# Patient Record
Sex: MALE | Race: WHITE | NOT HISPANIC OR LATINO | Employment: OTHER | URBAN - METROPOLITAN AREA
[De-identification: names, ages, dates, MRNs, and addresses within clinical notes are randomized per-mention and may not be internally consistent; named-entity substitution may affect disease eponyms.]

---

## 2017-03-31 ENCOUNTER — HOSPITAL ENCOUNTER (OUTPATIENT)
Dept: LAB | Facility: MEDICAL CENTER | Age: 62
End: 2017-03-31
Attending: ORTHOPAEDIC SURGERY
Payer: COMMERCIAL

## 2017-03-31 LAB
CRP SERPL HS-MCNC: 2.5 MG/L (ref 0–7.5)
ERYTHROCYTE [SEDIMENTATION RATE] IN BLOOD BY WESTERGREN METHOD: 9 MM/HOUR (ref 0–20)

## 2017-03-31 PROCEDURE — 36415 COLL VENOUS BLD VENIPUNCTURE: CPT

## 2017-03-31 PROCEDURE — 85652 RBC SED RATE AUTOMATED: CPT

## 2017-03-31 PROCEDURE — 86141 C-REACTIVE PROTEIN HS: CPT

## 2017-04-24 ENCOUNTER — APPOINTMENT (OUTPATIENT)
Dept: RADIOLOGY | Facility: MEDICAL CENTER | Age: 62
DRG: 189 | End: 2017-04-24
Attending: EMERGENCY MEDICINE
Payer: COMMERCIAL

## 2017-04-24 ENCOUNTER — RESOLUTE PROFESSIONAL BILLING HOSPITAL PROF FEE (OUTPATIENT)
Dept: MEDSURG UNIT | Facility: MEDICAL CENTER | Age: 62
End: 2017-04-24
Payer: COMMERCIAL

## 2017-04-24 ENCOUNTER — HOSPITAL ENCOUNTER (INPATIENT)
Facility: MEDICAL CENTER | Age: 62
LOS: 2 days | DRG: 189 | End: 2017-04-26
Attending: EMERGENCY MEDICINE | Admitting: INTERNAL MEDICINE
Payer: COMMERCIAL

## 2017-04-24 DIAGNOSIS — J45.901 ASTHMA EXACERBATION: ICD-10-CM

## 2017-04-24 PROBLEM — K21.9 GERD (GASTROESOPHAGEAL REFLUX DISEASE): Status: ACTIVE | Noted: 2017-04-24

## 2017-04-24 PROBLEM — R94.31 PROLONGED Q-T INTERVAL ON ECG: Status: ACTIVE | Noted: 2017-04-24

## 2017-04-24 PROBLEM — E87.6 HYPOKALEMIA: Status: ACTIVE | Noted: 2017-04-24

## 2017-04-24 PROBLEM — R91.1 PULMONARY NODULE, RIGHT: Status: ACTIVE | Noted: 2017-04-24

## 2017-04-24 PROBLEM — L40.9 PSORIASIS AND SIMILAR DISORDER: Status: ACTIVE | Noted: 2017-04-24

## 2017-04-24 PROBLEM — R21 SKIN RASH: Status: ACTIVE | Noted: 2017-04-24

## 2017-04-24 LAB
ALBUMIN SERPL BCP-MCNC: 4.3 G/DL (ref 3.2–4.9)
ALBUMIN/GLOB SERPL: 1.3 G/DL
ALP SERPL-CCNC: 87 U/L (ref 30–99)
ALT SERPL-CCNC: 36 U/L (ref 2–50)
ANION GAP SERPL CALC-SCNC: 11 MMOL/L (ref 0–11.9)
APTT PPP: 34.9 SEC (ref 24.7–36)
AST SERPL-CCNC: 30 U/L (ref 12–45)
BASOPHILS # BLD AUTO: 0.5 % (ref 0–1.8)
BASOPHILS # BLD: 0.05 K/UL (ref 0–0.12)
BILIRUB SERPL-MCNC: 0.8 MG/DL (ref 0.1–1.5)
BNP SERPL-MCNC: 19 PG/ML (ref 0–100)
BUN SERPL-MCNC: 15 MG/DL (ref 8–22)
CALCIUM SERPL-MCNC: 9.9 MG/DL (ref 8.5–10.5)
CHLORIDE SERPL-SCNC: 101 MMOL/L (ref 96–112)
CO2 SERPL-SCNC: 23 MMOL/L (ref 20–33)
CREAT SERPL-MCNC: 0.84 MG/DL (ref 0.5–1.4)
DEPRECATED D DIMER PPP IA-ACNC: 825 NG/ML(D-DU)
EKG IMPRESSION: NORMAL
EOSINOPHIL # BLD AUTO: 0.3 K/UL (ref 0–0.51)
EOSINOPHIL NFR BLD: 3.1 % (ref 0–6.9)
ERYTHROCYTE [DISTWIDTH] IN BLOOD BY AUTOMATED COUNT: 43.4 FL (ref 35.9–50)
EST. AVERAGE GLUCOSE BLD GHB EST-MCNC: 117 MG/DL
GFR SERPL CREATININE-BSD FRML MDRD: >60 ML/MIN/1.73 M 2
GLOBULIN SER CALC-MCNC: 3.4 G/DL (ref 1.9–3.5)
GLUCOSE SERPL-MCNC: 146 MG/DL (ref 65–99)
HBA1C MFR BLD: 5.7 % (ref 0–5.6)
HCT VFR BLD AUTO: 49.3 % (ref 42–52)
HGB BLD-MCNC: 16.6 G/DL (ref 14–18)
IMM GRANULOCYTES # BLD AUTO: 0.05 K/UL (ref 0–0.11)
IMM GRANULOCYTES NFR BLD AUTO: 0.5 % (ref 0–0.9)
INR PPP: 1.18 (ref 0.87–1.13)
LACTATE BLD-SCNC: 2.8 MMOL/L (ref 0.5–2)
LYMPHOCYTES # BLD AUTO: 2.24 K/UL (ref 1–4.8)
LYMPHOCYTES NFR BLD: 23.4 % (ref 22–41)
MCH RBC QN AUTO: 29.6 PG (ref 27–33)
MCHC RBC AUTO-ENTMCNC: 33.7 G/DL (ref 33.7–35.3)
MCV RBC AUTO: 88 FL (ref 81.4–97.8)
MONOCYTES # BLD AUTO: 0.89 K/UL (ref 0–0.85)
MONOCYTES NFR BLD AUTO: 9.3 % (ref 0–13.4)
NEUTROPHILS # BLD AUTO: 6.05 K/UL (ref 1.82–7.42)
NEUTROPHILS NFR BLD: 63.2 % (ref 44–72)
NRBC # BLD AUTO: 0 K/UL
NRBC BLD AUTO-RTO: 0 /100 WBC
PLATELET # BLD AUTO: 347 K/UL (ref 164–446)
PMV BLD AUTO: 9 FL (ref 9–12.9)
POTASSIUM SERPL-SCNC: 3.4 MMOL/L (ref 3.6–5.5)
PROT SERPL-MCNC: 7.7 G/DL (ref 6–8.2)
PROTHROMBIN TIME: 15.4 SEC (ref 12–14.6)
RBC # BLD AUTO: 5.6 M/UL (ref 4.7–6.1)
SODIUM SERPL-SCNC: 135 MMOL/L (ref 135–145)
TROPONIN I SERPL-MCNC: <0.01 NG/ML (ref 0–0.04)
TSH SERPL DL<=0.005 MIU/L-ACNC: 1.06 UIU/ML (ref 0.3–3.7)
WBC # BLD AUTO: 9.6 K/UL (ref 4.8–10.8)

## 2017-04-24 PROCEDURE — A9270 NON-COVERED ITEM OR SERVICE: HCPCS | Performed by: STUDENT IN AN ORGANIZED HEALTH CARE EDUCATION/TRAINING PROGRAM

## 2017-04-24 PROCEDURE — 700111 HCHG RX REV CODE 636 W/ 250 OVERRIDE (IP): Performed by: STUDENT IN AN ORGANIZED HEALTH CARE EDUCATION/TRAINING PROGRAM

## 2017-04-24 PROCEDURE — 99285 EMERGENCY DEPT VISIT HI MDM: CPT

## 2017-04-24 PROCEDURE — 96365 THER/PROPH/DIAG IV INF INIT: CPT

## 2017-04-24 PROCEDURE — A9270 NON-COVERED ITEM OR SERVICE: HCPCS | Performed by: HOSPITALIST

## 2017-04-24 PROCEDURE — 93005 ELECTROCARDIOGRAM TRACING: CPT | Performed by: HOSPITALIST

## 2017-04-24 PROCEDURE — 83605 ASSAY OF LACTIC ACID: CPT

## 2017-04-24 PROCEDURE — 700105 HCHG RX REV CODE 258: Performed by: STUDENT IN AN ORGANIZED HEALTH CARE EDUCATION/TRAINING PROGRAM

## 2017-04-24 PROCEDURE — 700111 HCHG RX REV CODE 636 W/ 250 OVERRIDE (IP): Performed by: EMERGENCY MEDICINE

## 2017-04-24 PROCEDURE — 94760 N-INVAS EAR/PLS OXIMETRY 1: CPT

## 2017-04-24 PROCEDURE — 700101 HCHG RX REV CODE 250: Performed by: EMERGENCY MEDICINE

## 2017-04-24 PROCEDURE — 700102 HCHG RX REV CODE 250 W/ 637 OVERRIDE(OP): Performed by: STUDENT IN AN ORGANIZED HEALTH CARE EDUCATION/TRAINING PROGRAM

## 2017-04-24 PROCEDURE — 700105 HCHG RX REV CODE 258: Performed by: EMERGENCY MEDICINE

## 2017-04-24 PROCEDURE — 84484 ASSAY OF TROPONIN QUANT: CPT

## 2017-04-24 PROCEDURE — 96375 TX/PRO/DX INJ NEW DRUG ADDON: CPT

## 2017-04-24 PROCEDURE — 96368 THER/DIAG CONCURRENT INF: CPT

## 2017-04-24 PROCEDURE — 87040 BLOOD CULTURE FOR BACTERIA: CPT | Mod: 91

## 2017-04-24 PROCEDURE — 700101 HCHG RX REV CODE 250: Performed by: STUDENT IN AN ORGANIZED HEALTH CARE EDUCATION/TRAINING PROGRAM

## 2017-04-24 PROCEDURE — 85025 COMPLETE CBC W/AUTO DIFF WBC: CPT

## 2017-04-24 PROCEDURE — 83036 HEMOGLOBIN GLYCOSYLATED A1C: CPT

## 2017-04-24 PROCEDURE — 36415 COLL VENOUS BLD VENIPUNCTURE: CPT

## 2017-04-24 PROCEDURE — 83880 ASSAY OF NATRIURETIC PEPTIDE: CPT

## 2017-04-24 PROCEDURE — 85730 THROMBOPLASTIN TIME PARTIAL: CPT

## 2017-04-24 PROCEDURE — 94640 AIRWAY INHALATION TREATMENT: CPT

## 2017-04-24 PROCEDURE — 84443 ASSAY THYROID STIM HORMONE: CPT

## 2017-04-24 PROCEDURE — 96361 HYDRATE IV INFUSION ADD-ON: CPT

## 2017-04-24 PROCEDURE — 85610 PROTHROMBIN TIME: CPT

## 2017-04-24 PROCEDURE — 71010 DX-CHEST-PORTABLE (1 VIEW): CPT

## 2017-04-24 PROCEDURE — 700102 HCHG RX REV CODE 250 W/ 637 OVERRIDE(OP): Performed by: HOSPITALIST

## 2017-04-24 PROCEDURE — 770006 HCHG ROOM/CARE - MED/SURG/GYN SEMI*

## 2017-04-24 PROCEDURE — 700117 HCHG RX CONTRAST REV CODE 255: Performed by: EMERGENCY MEDICINE

## 2017-04-24 PROCEDURE — 85379 FIBRIN DEGRADATION QUANT: CPT

## 2017-04-24 PROCEDURE — 87070 CULTURE OTHR SPECIMN AEROBIC: CPT

## 2017-04-24 PROCEDURE — 84145 PROCALCITONIN (PCT): CPT

## 2017-04-24 PROCEDURE — 80053 COMPREHEN METABOLIC PANEL: CPT

## 2017-04-24 PROCEDURE — 71275 CT ANGIOGRAPHY CHEST: CPT

## 2017-04-24 RX ORDER — SODIUM CHLORIDE 9 MG/ML
1000 INJECTION, SOLUTION INTRAVENOUS ONCE
Status: COMPLETED | OUTPATIENT
Start: 2017-04-24 | End: 2017-04-24

## 2017-04-24 RX ORDER — AZITHROMYCIN 250 MG/1
250-500 TABLET, FILM COATED ORAL DAILY
Status: ON HOLD | COMMUNITY
Start: 2017-04-19 | End: 2017-04-25

## 2017-04-24 RX ORDER — CEFTRIAXONE 2 G/1
2 INJECTION, POWDER, FOR SOLUTION INTRAMUSCULAR; INTRAVENOUS ONCE
Status: COMPLETED | OUTPATIENT
Start: 2017-04-24 | End: 2017-04-24

## 2017-04-24 RX ORDER — METHYLPREDNISOLONE SODIUM SUCCINATE 125 MG/2ML
125 INJECTION, POWDER, LYOPHILIZED, FOR SOLUTION INTRAMUSCULAR; INTRAVENOUS ONCE
Status: COMPLETED | OUTPATIENT
Start: 2017-04-24 | End: 2017-04-24

## 2017-04-24 RX ORDER — POTASSIUM CHLORIDE 20 MEQ/1
20 TABLET, EXTENDED RELEASE ORAL ONCE
Status: DISCONTINUED | OUTPATIENT
Start: 2017-04-24 | End: 2017-04-24

## 2017-04-24 RX ORDER — IPRATROPIUM BROMIDE AND ALBUTEROL SULFATE 2.5; .5 MG/3ML; MG/3ML
3 SOLUTION RESPIRATORY (INHALATION)
Status: DISCONTINUED | OUTPATIENT
Start: 2017-04-24 | End: 2017-04-25

## 2017-04-24 RX ORDER — OMEPRAZOLE 20 MG/1
20 CAPSULE, DELAYED RELEASE ORAL DAILY
Status: DISCONTINUED | OUTPATIENT
Start: 2017-04-24 | End: 2017-04-26 | Stop reason: HOSPADM

## 2017-04-24 RX ORDER — GUAIFENESIN/DEXTROMETHORPHAN 100-10MG/5
10 SYRUP ORAL EVERY 6 HOURS PRN
Status: DISCONTINUED | OUTPATIENT
Start: 2017-04-24 | End: 2017-04-26 | Stop reason: HOSPADM

## 2017-04-24 RX ORDER — POTASSIUM CHLORIDE 20 MEQ/1
40 TABLET, EXTENDED RELEASE ORAL ONCE
Status: COMPLETED | OUTPATIENT
Start: 2017-04-24 | End: 2017-04-24

## 2017-04-24 RX ORDER — HYDROCODONE BITARTRATE AND ACETAMINOPHEN 10; 325 MG/1; MG/1
1-2 TABLET ORAL EVERY 4 HOURS PRN
Status: DISCONTINUED | OUTPATIENT
Start: 2017-04-24 | End: 2017-04-26 | Stop reason: HOSPADM

## 2017-04-24 RX ORDER — ALBUTEROL SULFATE 90 UG/1
2 AEROSOL, METERED RESPIRATORY (INHALATION) EVERY 6 HOURS PRN
COMMUNITY

## 2017-04-24 RX ORDER — AZITHROMYCIN 500 MG/1
500 INJECTION, POWDER, LYOPHILIZED, FOR SOLUTION INTRAVENOUS ONCE
Status: COMPLETED | OUTPATIENT
Start: 2017-04-24 | End: 2017-04-24

## 2017-04-24 RX ORDER — DIPHENHYDRAMINE HCL 25 MG
25 TABLET ORAL EVERY 6 HOURS PRN
Status: DISCONTINUED | OUTPATIENT
Start: 2017-04-24 | End: 2017-04-26 | Stop reason: HOSPADM

## 2017-04-24 RX ORDER — PREDNISONE 20 MG/1
40 TABLET ORAL DAILY
Status: DISCONTINUED | OUTPATIENT
Start: 2017-04-24 | End: 2017-04-26 | Stop reason: HOSPADM

## 2017-04-24 RX ORDER — CYCLOBENZAPRINE HCL 10 MG
10 TABLET ORAL 3 TIMES DAILY PRN
Status: DISCONTINUED | OUTPATIENT
Start: 2017-04-24 | End: 2017-04-26 | Stop reason: HOSPADM

## 2017-04-24 RX ORDER — AMOXICILLIN AND CLAVULANATE POTASSIUM 875; 125 MG/1; MG/1
1 TABLET, FILM COATED ORAL 2 TIMES DAILY
Status: ON HOLD | COMMUNITY
Start: 2017-04-05 | End: 2017-04-25

## 2017-04-24 RX ORDER — CLOBETASOL PROPIONATE 0.5 MG/G
OINTMENT TOPICAL 2 TIMES DAILY
Status: DISCONTINUED | OUTPATIENT
Start: 2017-04-24 | End: 2017-04-26 | Stop reason: HOSPADM

## 2017-04-24 RX ORDER — ALBUTEROL SULFATE 90 UG/1
2 AEROSOL, METERED RESPIRATORY (INHALATION)
Status: DISCONTINUED | OUTPATIENT
Start: 2017-04-24 | End: 2017-04-25

## 2017-04-24 RX ORDER — HYDRALAZINE HYDROCHLORIDE 20 MG/ML
20 INJECTION INTRAMUSCULAR; INTRAVENOUS EVERY 6 HOURS PRN
Status: DISCONTINUED | OUTPATIENT
Start: 2017-04-24 | End: 2017-04-26 | Stop reason: HOSPADM

## 2017-04-24 RX ORDER — BISACODYL 10 MG
10 SUPPOSITORY, RECTAL RECTAL
Status: DISCONTINUED | OUTPATIENT
Start: 2017-04-24 | End: 2017-04-26 | Stop reason: HOSPADM

## 2017-04-24 RX ORDER — AMOXICILLIN 250 MG
2 CAPSULE ORAL 2 TIMES DAILY
Status: DISCONTINUED | OUTPATIENT
Start: 2017-04-24 | End: 2017-04-26 | Stop reason: HOSPADM

## 2017-04-24 RX ORDER — POLYETHYLENE GLYCOL 3350 17 G/17G
1 POWDER, FOR SOLUTION ORAL
Status: DISCONTINUED | OUTPATIENT
Start: 2017-04-24 | End: 2017-04-26 | Stop reason: HOSPADM

## 2017-04-24 RX ORDER — METHYLPREDNISOLONE 4 MG/1
4-24 TABLET ORAL DAILY
Status: ON HOLD | COMMUNITY
Start: 2017-04-19 | End: 2017-04-25

## 2017-04-24 RX ADMIN — CEFTRIAXONE 2 G: 2 INJECTION, POWDER, FOR SOLUTION INTRAMUSCULAR; INTRAVENOUS at 19:41

## 2017-04-24 RX ADMIN — METHYLPREDNISOLONE SODIUM SUCCINATE 125 MG: 125 INJECTION, POWDER, FOR SOLUTION INTRAMUSCULAR; INTRAVENOUS at 17:11

## 2017-04-24 RX ADMIN — ALBUTEROL SULFATE 2 PUFF: 90 AEROSOL, METERED RESPIRATORY (INHALATION) at 23:26

## 2017-04-24 RX ADMIN — SODIUM CHLORIDE 1000 ML: 9 INJECTION, SOLUTION INTRAVENOUS at 22:44

## 2017-04-24 RX ADMIN — IOHEXOL 75 ML: 350 INJECTION, SOLUTION INTRAVENOUS at 19:11

## 2017-04-24 RX ADMIN — ALBUTEROL SULFATE 2.5 MG: 2.5 SOLUTION RESPIRATORY (INHALATION) at 17:18

## 2017-04-24 RX ADMIN — DOXYCYCLINE 100 MG: 100 INJECTION, POWDER, LYOPHILIZED, FOR SOLUTION INTRAVENOUS at 23:22

## 2017-04-24 RX ADMIN — OMEPRAZOLE 20 MG: 20 CAPSULE, DELAYED RELEASE ORAL at 23:06

## 2017-04-24 RX ADMIN — PREDNISONE 40 MG: 20 TABLET ORAL at 23:06

## 2017-04-24 RX ADMIN — AZITHROMYCIN MONOHYDRATE 500 MG: 500 INJECTION, POWDER, LYOPHILIZED, FOR SOLUTION INTRAVENOUS at 20:39

## 2017-04-24 RX ADMIN — SODIUM CHLORIDE 1000 ML: 9 INJECTION, SOLUTION INTRAVENOUS at 17:11

## 2017-04-24 RX ADMIN — POTASSIUM CHLORIDE 40 MEQ: 1500 TABLET, EXTENDED RELEASE ORAL at 20:39

## 2017-04-24 ASSESSMENT — ENCOUNTER SYMPTOMS
DIAPHORESIS: 0
SORE THROAT: 0
BLURRED VISION: 0
DIZZINESS: 0
ABDOMINAL PAIN: 0
FEVER: 0
DOUBLE VISION: 0
BRUISES/BLEEDS EASILY: 0
BLOOD IN STOOL: 0
NERVOUS/ANXIOUS: 0
HEARTBURN: 1
CONSTIPATION: 0
NAUSEA: 0
SHORTNESS OF BREATH: 1
WEAKNESS: 0
DIARRHEA: 0
HEMOPTYSIS: 0
CHILLS: 0
SENSORY CHANGE: 0
VOMITING: 0
PALPITATIONS: 0
COUGH: 1
FALLS: 0
SPUTUM PRODUCTION: 1
FOCAL WEAKNESS: 0
BACK PAIN: 1
HEADACHES: 1
WHEEZING: 1

## 2017-04-24 ASSESSMENT — LIFESTYLE VARIABLES
ALCOHOL_USE: NO
SUBSTANCE_ABUSE: 0
DO YOU DRINK ALCOHOL: NO
EVER_SMOKED: NEVER

## 2017-04-24 ASSESSMENT — PAIN SCALES - GENERAL: PAINLEVEL_OUTOF10: 0

## 2017-04-24 NOTE — IP AVS SNAPSHOT
" Home Care Instructions                                                                                                                  Name:Everardo Edwards  Medical Record Number:[id number]  CSN: [id number]    YOB: 1955   Age: 61 y.o.  Sex: male  HT:1.753 m (5' 9\") WT: 74.4 kg (164 lb 0.4 oz)          Admit Date: 4/24/2017     Discharge Date:   Today's Date: 4/26/2017  Attending Doctor:  Luis Lakhani                  Allergies:  Review of patient's allergies indicates no known allergies.            Discharge Instructions       Discharge Instructions    Discharged to home by car with relative. Discharged via wheelchair, hospital escort: Yes.  Special equipment needed: Not Applicable    Be sure to schedule a follow-up appointment with your primary care doctor or any specialists as instructed.     Discharge Plan:   Diet Plan: Discussed  Activity Level: Discussed  Confirmed Follow up Appointment: Appointment Scheduled  Confirmed Symptoms Management: Discussed  Medication Reconciliation Updated: Yes  Influenza Vaccine Indication: Patient Refuses    I understand that a diet low in cholesterol, fat, and sodium is recommended for good health. Unless I have been given specific instructions below for another diet, I accept this instruction as my diet prescription.   Other diet: regular    Special Instructions: Please remind patient to follow up his scheduled appointment with his PCP 04/28/17 and dermatologist 05/10/17      · Is patient discharged on Warfarin / Coumadin?   No     · Is patient Post Blood Transfusion?  No    Depression / Suicide Risk    As you are discharged from this [facility] facility, it is important to learn how to keep safe from harming yourself.    Recognize the warning signs:  · Abrupt changes in personality, positive or negative- including increase in energy   · Giving away possessions  · Change in eating patterns- significant weight changes-  positive or negative  · Change " in sleeping patterns- unable to sleep or sleeping all the time   · Unwillingness or inability to communicate  · Depression  · Unusual sadness, discouragement and loneliness  · Talk of wanting to die  · Neglect of personal appearance   · Rebelliousness- reckless behavior  · Withdrawal from people/activities they love  · Confusion- inability to concentrate     If you or a loved one observes any of these behaviors or has concerns about self-harm, here's what you can do:  · Talk about it- your feelings and reasons for harming yourself  · Remove any means that you might use to hurt yourself (examples: pills, rope, extension cords, firearm)  · Get professional help from the community (Mental Health, Substance Abuse, psychological counseling)  · Do not be alone:Call your Safe Contact- someone whom you trust who will be there for you.  · Call your local CRISIS HOTLINE 009-5350 or 911-968-0755  · Call your local Children's Mobile Crisis Response Team [place] (587) 301-1985 or www.Inviragen  · Call the toll free National Suicide Prevention Hotlines   · National Suicide Prevention Lifeline 728-445-ACPD (8537)  · National Hope Line Network 800-SUICIDE (926-1200)           Discharge Medication Instructions:    Below are the medications your physician expects you to take upon discharge:    Review all your home medications and newly ordered medications with your doctor and/or pharmacist. Follow medication instructions as directed by your doctor and/or pharmacist.    Please keep your medication list with you and share with your physician.               Medication List      START taking these medications        Instructions    Morning Afternoon Evening Bedtime    predniSONE 10 MG Tabs   Last time this was given:  40 mg on 4/26/2017  7:35 AM   Commonly known as:  DELTASONE        Take as described. And follow with Dermatology.  40mg x 1 day. 4/26/17, 30mg x 4 days 4/27-4/30, 20mg x 4days 4/31-5/03, 10mg x 4days  5/04-5/07,  5mg x 4days.  5/8 to 5/12.                          CONTINUE taking these medications        Instructions    Morning Afternoon Evening Bedtime    albuterol 108 (90 BASE) MCG/ACT Aers inhalation aerosol   Last time this was given:  2 Puffs on 4/24/2017 11:26 PM        Inhale 2 Puffs by mouth every 6 hours as needed for Shortness of Breath.   Dose:  2 Puff                        cyclobenzaprine 10 MG Tabs   Commonly known as:  FLEXERIL        Take 10 mg by mouth 3 times a day as needed.   Dose:  10 mg                        fluticasone-salmeterol 100-50 MCG/DOSE Aepb   Commonly known as:  ADVAIR        Inhale 1 Puff by mouth every day.   Dose:  1 Puff                        hydrocodone/acetaminophen  MG Tabs   Last time this was given:  1 Tab on 4/25/2017  7:03 AM   Commonly known as:  NORCO        Take 1-2 Tabs by mouth every four hours as needed for Moderate Pain.   Dose:  1-2 Tab                        MUCINEX PO        Take 15-30 mL by mouth 2 times a day as needed.   Dose:  15-30 mL                          STOP taking these medications     amoxicillin-clavulanate 875-125 MG Tabs   Commonly known as:  AUGMENTIN               azithromycin 250 MG Tabs   Commonly known as:  ZITHROMAX               MethylPREDNISolone 4 MG Tbpk   Commonly known as:  MEDROL DOSEPAK                    Where to Get Your Medications      Information about where to get these medications is not yet available     ! Ask your nurse or doctor about these medications    - predniSONE 10 MG Tabs            Instructions           Diet / Nutrition:    Follow any diet instructions given to you by your doctor or the dietician, including how much salt (sodium) you are allowed each day.    If you are overweight, talk to your doctor about a weight reduction plan.    Activity:    Remain physically active following your doctor's instructions about exercise and activity.    Rest often.     Any time you become even a little tired or short  of breath, SIT DOWN and rest.    Worsening Symptoms:    Report any of the following signs and symptoms to the doctor's office immediately:    *Pain of jaw, arm, or neck  *Chest pain not relieved by medication                               *Dizziness or loss of consciousness  *Difficulty breathing even when at rest   *More tired than usual                                       *Bleeding drainage or swelling of surgical site  *Swelling of feet, ankles, legs or stomach                 *Fever (>100ºF)  *Pink or blood tinged sputum  *Weight gain (3lbs/day or 5lbs /week)           *Shock from internal defibrillator (if applicable)  *Palpitations or irregular heartbeats                *Cool and/or numb extremities    Stroke Awareness    Common Risk Factors for Stroke include:    Age  Atrial Fibrillation  Carotid Artery Stenosis  Diabetes Mellitus  Excessive alcohol consumption  High blood pressure  Overweight   Physical inactivity  Smoking    Warning signs and symptoms of a stroke include:    *Sudden numbness or weakness of the face, arm or leg (especially on one side of the body).  *Sudden confusion, trouble speaking or understanding.  *Sudden trouble seeing in one or both eyes.  *Sudden trouble walking, dizziness, loss of balance or coordination.Sudden severe headache with no known cause.    It is very important to get treatment quickly when a stroke occurs. If you experience any of the above warning signs, call 911 immediately.                   Disclaimer         Quit Smoking / Tobacco Use:    I understand the use of any tobacco products increases my chance of suffering from future heart disease or stroke and could cause other illnesses which may shorten my life. Quitting the use of tobacco products is the single most important thing I can do to improve my health. For further information on smoking / tobacco cessation call a Toll Free Quit Line at 1-732.747.6593 (*National [employer] [place]) or 1-914.205.1704 (American  Lung Association) or you can access the web based program at www.lungusa.org.    [place] Tobacco Users Help Line:  (336) 762-1949       Toll Free: 1-994.307.1009  Quit Tobacco Program [facility] (520)958-1557    Crisis Hotline:    [place] Crisis Hotline:  6-293-RKTRPZY or 1-361.427.8201    [place] Crisis Hotline:    1-568.913.9794 or 064-187-0913    Discharge Survey:   Thank you for choosing [facility]. We hope we did everything we could to make your hospital stay a pleasant one. You may be receiving a phone survey and we would appreciate your time and participation in answering the questions. Your input is very valuable to us in our efforts to improve our service to our patients and their families.        My signature on this form indicates that:    1. I have reviewed and understand the above information.  2. My questions regarding this information have been answered to my satisfaction.  3. I have formulated a plan with my discharge nurse to obtain my prescribed medications for home.                  Disclaimer         __________________________________                     __________       ________                       Patient Signature                                                 Date                    Time

## 2017-04-24 NOTE — IP AVS SNAPSHOT
" <p align=\"LEFT\"><IMG SRC=\"[email or web address]\" alt=\"Image\" WIDTH=\"50%\" HEIGHT=\"200\" BORDER=\"\"></p>                   Name:Everardo Edwards  Medical Record Number:[id number]  CSN: [id number]    YOB: 1955   Age: 61 y.o.  Sex: male  HT:1.753 m (5' 9\") WT: 74.4 kg (164 lb 0.4 oz)          Admit Date: 4/24/2017     Discharge Date:   Today's Date: 4/26/2017  Attending Doctor:  Luis Lakhani                  Allergies:  Review of patient's allergies indicates no known allergies.             Medication List      Take these Medications        Instructions    albuterol 108 (90 BASE) MCG/ACT Aers inhalation aerosol    Inhale 2 Puffs by mouth every 6 hours as needed for Shortness of Breath.   Dose:  2 Puff       cyclobenzaprine 10 MG Tabs   Commonly known as:  FLEXERIL    Take 10 mg by mouth 3 times a day as needed.   Dose:  10 mg       fluticasone-salmeterol 100-50 MCG/DOSE Aepb   Commonly known as:  ADVAIR    Inhale 1 Puff by mouth every day.   Dose:  1 Puff       hydrocodone/acetaminophen  MG Tabs   Commonly known as:  NORCO    Take 1-2 Tabs by mouth every four hours as needed for Moderate Pain.   Dose:  1-2 Tab       MUCINEX PO    Take 15-30 mL by mouth 2 times a day as needed.   Dose:  15-30 mL       predniSONE 10 MG Tabs   Commonly known as:  DELTASONE    Take as described. And follow with Dermatology.  40mg x 1 day. 4/26/17, 30mg x 4 days 4/27-4/30, 20mg x 4days 4/31-5/03, 10mg x 4days 5/04-5/07,  5mg x 4days.  5/8 to 5/12.         "

## 2017-04-24 NOTE — ED NOTES
.  Chief Complaint   Patient presents with   • Bronchitis     seen at [facility] dx wednesday   • Cold Symptoms     x 2 weeks   • Hives     torso    • Rash     left knee x 1 month     Pt reports sob cold symptoms x 2 weeks. Multiple other c/o. Pt has hives to torso x 1 month. Rash to post op left knee intermittent since oct. Pt from [place] seen at [place], orthopedic and hematologist for these symptoms. Remains unknown as to what is cause.

## 2017-04-24 NOTE — IP AVS SNAPSHOT
4/26/2017    Everardo Edwards  [id number] [facility] [address]    Dear Everardo:    Replaced by [facility] wants to ensure your discharge home is safe and you or your loved ones have had all of your questions answered regarding your care after you leave the hospital.    Below is a list of resources and contact information should you have any questions regarding your hospital stay, follow-up instructions, or active medical symptoms.    Questions or Concerns Regarding… Contact   Medical Questions Related to Your Discharge  (7 days a week, 8am-5pm) Contact a Nurse Care Coordinator   [phone]   Medical Questions Not Related to Your Discharge  (24 hours a day / 7 days a week)  Contact the Nurse Health Line   [phone]    Medications or Discharge Instructions Refer to your discharge packet   or contact your [facility] Primary Care Provider   [phone]   Follow-up Appointment(s) Schedule your appointment via Satispay   or contact Scheduling [phone]   Billing Review your statement via Satispay  or contact Billing [phone]   Medical Records Review your records via Satispay   or contact Medical Records [phone]     You may receive a telephone call within two days of discharge. This call is to make certain you understand your discharge instructions and have the opportunity to have any questions answered. You can also easily access your medical information, test results and upcoming appointments via the Satispay free online health management tool. You can learn more and sign up at [email or web address]. For assistance setting up your Satispay account, please call [phone].    Once again, we want to ensure your discharge home is safe and that you have a clear understanding of any next steps in your care. If you have any questions or concerns, please do not hesitate to contact us, we are here for you. Thank you for choosing [facility] for your healthcare needs.    Sincerely,    Your [facility] Healthcare Team

## 2017-04-24 NOTE — IP AVS SNAPSHOT
[employer] Access Code: [email or web address]  Expires: 4/30/2017  4:37 PM    [employer]  A secure, online tool to manage your health information     [employer]’s [employer]® is a secure, online tool that connects you to your personalized health information from the privacy of your home -- day or night - making it very easy for you to manage your healthcare. Once the activation process is completed, you can even access your medical information using the [employer] reynaldo, which is available for free in the Apple Reynaldo store or Google Play store.     [employer] provides the following levels of access (as shown below):   My Chart Features   [facility] Primary Care Doctor [facility]  Specialists [facility]  Urgent  Care Non-[facility]  Primary Care  Doctor   Email your healthcare team securely and privately 24/7 X X X X   Manage appointments: schedule your next appointment; view details of past/upcoming appointments X      Request prescription refills. X      View recent personal medical records, including lab and immunizations X X X X   View health record, including health history, allergies, medications X X X X   Read reports about your outpatient visits, procedures, consult and ER notes X X X X   See your discharge summary, which is a recap of your hospital and/or ER visit that includes your diagnosis, lab results, and care plan. X X       How to register for [employer]:  1. Go to  [email or web address].  2. Click on the Sign Up Now box, which takes you to the New Member Sign Up page. You will need to provide the following information:  a. Enter your [employer] Access Code exactly as it appears at the top of this page. (You will not need to use this code after you’ve completed the sign-up process. If you do not sign up before the expiration date, you must request a new code.)   b. Enter your date of birth.   c. Enter your home email address.   d. Click Submit, and follow the next screen’s instructions.  3. Create a [employer] ID. This will be your [employer]  login ID and cannot be changed, so think of one that is secure and easy to remember.  4. Create a [employer] password. You can change your password at any time.  5. Enter your Password Reset Question and Answer. This can be used at a later time if you forget your password.   6. Enter your e-mail address. This allows you to receive e-mail notifications when new information is available in 24tidy.  7. Click Sign Up. You can now view your health information.    For assistance activating your [employer] account, call [phone]

## 2017-04-24 NOTE — ED PROVIDER NOTES
ED Provider Note    CHIEF COMPLAINT  Chief Complaint   Patient presents with   • Bronchitis     [facility]   • Cold Symptoms     x 2 weeks   • Hives     torso    • Rash     left knee x 1 month       HPI  Everardo Edwards is a 61 y.o. male who presents for evaluation of cough shortness of breath. The patient has a comp care course ever since he had a knee replacement about quite months ago with orthopedics. He has had strange rashes some abnormal quad dilation studies. He has a history of asthma. Here he primarily reports cough and cold symptoms consistent with bronchitis but he also reports some general shortness of breath no hemoptysis. No history of blood clots. He uses albuterol at home. No high fevers or hemoptysis. He denies dyspnea or chest pain with exertion    REVIEW OF SYSTEMS  See HPI for further details. Positive for cough shortness of breath no hemoptysis no leg swelling All other systems are negative.     PAST MEDICAL HISTORY  Past Medical History   Diagnosis Date   • Arthritis    • Asthma    • Dental disorder      upper partial denture   • Pain      neck and left knee   • Anesthesia      PONV       FAMILY HISTORY  No history of bleeding disorder    SOCIAL HISTORY  Social History     Social History   • Marital Status:      Spouse Name: N/A   • Number of Children: N/A   • Years of Education: N/A     Social History Main Topics   • Smoking status: Never Smoker    • Smokeless tobacco: Never Used   • Alcohol Use: No   • Drug Use: No   • Sexual Activity: Not on file     Other Topics Concern   • Not on file     Social History Narrative     Nonsmoker no IV drugs  SURGICAL HISTORY  Past Surgical History   Procedure Laterality Date   • Sinusotomies  1989   • Knee arthroscopy Left 2007   • Acl reconstruction Left 1985   • Other surgical procedure  2001     Anterior Cervical fusion   • Knee arthroplasty total Left 10/19/2016     Procedure: KNEE ARTHROPLASTY TOTAL;  Surgeon: Zuleika  RADHA Almodovar;  Location: SURGERY [facility];  Service:        CURRENT MEDICATIONS  Home Medications     **Home medications have not yet been reviewed for this encounter**        Current facility-administered medications:   •  cefTRIAXone (ROCEPHIN) injection 2 g, 2 g, Intravenous, Once, Enrique Masters M.D.  •  azithromycin (ZITHROMAX) injection 500 mg, 500 mg, Intravenous, Once, Enrique Masters M.D.    Current outpatient prescriptions:   •  hydrocodone/acetaminophen (NORCO)  MG Tab, Take 1-2 Tabs by mouth every four hours as needed for Moderate Pain., Disp: 60 Tab, Rfl: 0  •  tramadol (ULTRAM) 50 MG Tab, Take 1-2 Tabs by mouth every 6 hours as needed for Mild Pain or Moderate Pain (Can be alternated with Norco)., Disp: 60 Tab, Rfl: 2  •  aspirin EC (ECOTRIN) 325 MG Tablet Delayed Response, Take 1 Tab by mouth 2 times a day., Disp: 60 Tab, Rfl: 0  •  diazepam (VALIUM) 5 MG Tab, Take 1 Tab by mouth every 8 hours as needed (muscle spasms)., Disp: 40 Tab, Rfl: 0  •  ondansetron (ZOFRAN ODT) 4 MG TABLET DISPERSIBLE, Take 1 Tab by mouth every four hours as needed for Nausea/Vomiting., Disp: 20 Tab, Rfl: 0  •  senna-docusate (PERICOLACE OR SENOKOT S) 8.6-50 MG Tab, Take 1 Tab by mouth 2 times a day., Disp: 60 Tab, Rfl: 1  •  gabapentin (NEURONTIN) 300 MG Cap, Take 600 mg by mouth 3 times a day., Disp: , Rfl:   •  cyclobenzaprine (FLEXERIL) 10 MG Tab, Take 10 mg by mouth as needed., Disp: , Rfl:   •  fluticasone-salmeterol (ADVAIR) 100-50 MCG/DOSE AEROSOL POWDER, BREATH ACTIVATED, Inhale 1 Puff by mouth every day., Disp: , Rfl:   •  levalbuterol (XOPENEX HFA) 45 MCG/ACT inhaler, Inhale 1-2 Puffs by mouth every four hours as needed for Shortness of Breath., Disp: , Rfl:   •  ibuprofen (MOTRIN) 200 MG Tab, Take 200 mg by mouth every 6 hours as needed., Disp: , Rfl:   •  acetaminophen (TYLENOL) 500 MG Tab, Take 500-1,000 mg by mouth every 6 hours as needed., Disp: , Rfl:       ALLERGIES  No Known  "Allergies    PHYSICAL EXAM  VITAL SIGNS: /121 mmHg  Pulse 90  Temp(Src) 36.9 °C (98.4 °F)  Resp 18  Ht 1.753 m (5' 9\")  Wt 76 kg (167 lb 8.8 oz)  BMI 24.73 kg/m2  SpO2 95% Room air O2: 94    Constitutional: Increased work of breathing noted   HENT: Normocephalic, Atraumatic, Bilateral external ears normal, Oropharynx moist, No oral exudates, Nose normal.   Eyes: PERRLA, EOMI, Conjunctiva normal, No discharge.   Neck: Normal range of motion, No tenderness, Supple, No stridor.   Cardiovascular: Mild tachycardia, Normal rhythm, No murmurs, No rubs, No gallops.   Thorax & Lungs: bilateral rhonchi with wheezing, retractions noted No chest tenderness.   Abdomen: Bowel sounds normal, Soft, No tenderness, No masses, No pulsatile masses.   Skin: Warm, Dry, No erythema, No rash.   Back: No tenderness, No CVA tenderness.   Extremities: Intact distal pulses, No edema, No tenderness, No cyanosis, No clubbing.   Neurologic: Alert & oriented x 3, Normal motor function, Normal sensory function, No focal deficits noted.   Psychiatric: Affect normal, Judgment normal, Mood normal.       RADIOLOGY/PROCEDURES  CT-CTA CHEST PULMONARY ARTERY W/ RECONS   Final Result      1.  No CT evidence for pulmonary emboli.   2.  No pneumonia or pneumothorax.   3.  Moderate emphysema.   4.  Secretions are present within the lower lobe bronchi on both sides, concerning for developing pneumonia.   5.  Ill-defined nodular densities in the periphery of the upper lobes, potentially inflammatory.  Follow-up recommended to ensure resolution.      Fleischner Society Guideline (Radiology 237:2005) pulmonary nodule recommendations( >4-6 mm):      For low risk patients, follow-up at 12 months.  If no change, no further imaging is needed.   For high risk patients, initial follow-up CT at 6-12 months and then at 18-24 months if no change.         DX-CHEST-PORTABLE (1 VIEW)   Final Result      Central bronchial wall thickening compatible with viral " respiratory infection and/or reactive airways disease most commonly.      Nodular density over the right lower lung zone most likely represents a nipple shadow.  Consider repeat radiograph with nipple markers to confirm.        Results for orders placed or performed during the hospital encounter of 04/24/17   CBC WITH DIFFERENTIAL   Result Value Ref Range    WBC 9.6 4.8 - 10.8 K/uL    RBC 5.60 4.70 - 6.10 M/uL    Hemoglobin 16.6 14.0 - 18.0 g/dL    Hematocrit 49.3 42.0 - 52.0 %    MCV 88.0 81.4 - 97.8 fL    MCH 29.6 27.0 - 33.0 pg    MCHC 33.7 33.7 - 35.3 g/dL    RDW 43.4 35.9 - 50.0 fL    Platelet Count 347 164 - 446 K/uL    MPV 9.0 9.0 - 12.9 fL    Neutrophils-Polys 63.20 44.00 - 72.00 %    Lymphocytes 23.40 22.00 - 41.00 %    Monocytes 9.30 0.00 - 13.40 %    Eosinophils 3.10 0.00 - 6.90 %    Basophils 0.50 0.00 - 1.80 %    Immature Granulocytes 0.50 0.00 - 0.90 %    Nucleated RBC 0.00 /100 WBC    Neutrophils (Absolute) 6.05 1.82 - 7.42 K/uL    Lymphs (Absolute) 2.24 1.00 - 4.80 K/uL    Monos (Absolute) 0.89 (H) 0.00 - 0.85 K/uL    Eos (Absolute) 0.30 0.00 - 0.51 K/uL    Baso (Absolute) 0.05 0.00 - 0.12 K/uL    Immature Granulocytes (abs) 0.05 0.00 - 0.11 K/uL    NRBC (Absolute) 0.00 K/uL   COMP METABOLIC PANEL   Result Value Ref Range    Sodium 135 135 - 145 mmol/L    Potassium 3.4 (L) 3.6 - 5.5 mmol/L    Chloride 101 96 - 112 mmol/L    Co2 23 20 - 33 mmol/L    Anion Gap 11.0 0.0 - 11.9    Glucose 146 (H) 65 - 99 mg/dL    Bun 15 8 - 22 mg/dL    Creatinine 0.84 0.50 - 1.40 mg/dL    Calcium 9.9 8.5 - 10.5 mg/dL    AST(SGOT) 30 12 - 45 U/L    ALT(SGPT) 36 2 - 50 U/L    Alkaline Phosphatase 87 30 - 99 U/L    Total Bilirubin 0.8 0.1 - 1.5 mg/dL    Albumin 4.3 3.2 - 4.9 g/dL    Total Protein 7.7 6.0 - 8.2 g/dL    Globulin 3.4 1.9 - 3.5 g/dL    A-G Ratio 1.3 g/dL   TROPONIN   Result Value Ref Range    Troponin I <0.01 0.00 - 0.04 ng/mL   PROTHROMBIN TIME   Result Value Ref Range    PT 15.4 (H) 12.0 - 14.6 sec    INR  1.18 (H) 0.87 - 1.13   APTT   Result Value Ref Range    APTT 34.9 24.7 - 36.0 sec   D-DIMER   Result Value Ref Range    D-Dimer Screen 825 (H) <250 ng/mL(D-DU)   BTYPE NATRIURETIC PEPTIDE   Result Value Ref Range    B Natriuretic Peptide 19 0 - 100 pg/mL   ESTIMATED GFR   Result Value Ref Range    GFR If African American >60 >60 mL/min/1.73 m 2    GFR If Non African American >60 >60 mL/min/1.73 m 2        COURSE & MEDICAL DECISION MAKING  Pertinent Labs & Imaging studies reviewed. (See chart for details)  An IV was established. The patient had abnormal lung sounds with moderate to significant wheezing. He was given IV steroids, buccal dilators. Here differential diagnosis included pneumonia, asthma exacerbation, COPD pulmonary embolism. CT scan of the chest does not demonstrate any large pulmonary embolus but still does demonstrate early pneumonia. After treatment here he became hypoxic saturating down to about 87% and required supple oxygen. At this point I feel admission to the hospital for IV steroids, breathing treatments IV antibiotics are indicated. Blood cultures have been performed. He was given IV Rocephin and azithromycin. He'll be admitted to internal medicine    FINAL IMPRESSION  1. Community-acquired pneumonia    Admit         Electronically signed by: Enrique Masters, 4/24/2017 4:42 PM

## 2017-04-25 LAB
ALBUMIN SERPL BCP-MCNC: 3.8 G/DL (ref 3.2–4.9)
ALBUMIN/GLOB SERPL: 1.2 G/DL
ALP SERPL-CCNC: 70 U/L (ref 30–99)
ALT SERPL-CCNC: 33 U/L (ref 2–50)
ANION GAP SERPL CALC-SCNC: 9 MMOL/L (ref 0–11.9)
AST SERPL-CCNC: 25 U/L (ref 12–45)
BASOPHILS # BLD AUTO: 0.3 % (ref 0–1.8)
BASOPHILS # BLD: 0.02 K/UL (ref 0–0.12)
BILIRUB SERPL-MCNC: 0.5 MG/DL (ref 0.1–1.5)
BUN SERPL-MCNC: 13 MG/DL (ref 8–22)
CALCIUM SERPL-MCNC: 8.9 MG/DL (ref 8.5–10.5)
CHLORIDE SERPL-SCNC: 105 MMOL/L (ref 96–112)
CHOLEST SERPL-MCNC: 158 MG/DL (ref 100–199)
CO2 SERPL-SCNC: 22 MMOL/L (ref 20–33)
CREAT SERPL-MCNC: 0.69 MG/DL (ref 0.5–1.4)
EOSINOPHIL # BLD AUTO: 0 K/UL (ref 0–0.51)
EOSINOPHIL NFR BLD: 0 % (ref 0–6.9)
ERYTHROCYTE [DISTWIDTH] IN BLOOD BY AUTOMATED COUNT: 44.6 FL (ref 35.9–50)
GFR SERPL CREATININE-BSD FRML MDRD: >60 ML/MIN/1.73 M 2
GLOBULIN SER CALC-MCNC: 3.1 G/DL (ref 1.9–3.5)
GLUCOSE SERPL-MCNC: 167 MG/DL (ref 65–99)
HCT VFR BLD AUTO: 44.9 % (ref 42–52)
HDLC SERPL-MCNC: 42 MG/DL
HGB BLD-MCNC: 15.2 G/DL (ref 14–18)
IMM GRANULOCYTES # BLD AUTO: 0.04 K/UL (ref 0–0.11)
IMM GRANULOCYTES NFR BLD AUTO: 0.6 % (ref 0–0.9)
LACTATE BLD-SCNC: 3.8 MMOL/L (ref 0.5–2)
LDLC SERPL CALC-MCNC: 100 MG/DL
LYMPHOCYTES # BLD AUTO: 0.8 K/UL (ref 1–4.8)
LYMPHOCYTES NFR BLD: 12.6 % (ref 22–41)
MCH RBC QN AUTO: 29.9 PG (ref 27–33)
MCHC RBC AUTO-ENTMCNC: 33.9 G/DL (ref 33.7–35.3)
MCV RBC AUTO: 88.4 FL (ref 81.4–97.8)
MONOCYTES # BLD AUTO: 0.07 K/UL (ref 0–0.85)
MONOCYTES NFR BLD AUTO: 1.1 % (ref 0–13.4)
NEUTROPHILS # BLD AUTO: 5.44 K/UL (ref 1.82–7.42)
NEUTROPHILS NFR BLD: 85.4 % (ref 44–72)
NRBC # BLD AUTO: 0.02 K/UL
NRBC BLD AUTO-RTO: 0.3 /100 WBC
PLATELET # BLD AUTO: 332 K/UL (ref 164–446)
PMV BLD AUTO: 8.8 FL (ref 9–12.9)
POTASSIUM SERPL-SCNC: 4 MMOL/L (ref 3.6–5.5)
PROT SERPL-MCNC: 6.9 G/DL (ref 6–8.2)
RBC # BLD AUTO: 5.08 M/UL (ref 4.7–6.1)
SODIUM SERPL-SCNC: 136 MMOL/L (ref 135–145)
TRIGL SERPL-MCNC: 79 MG/DL (ref 0–149)
WBC # BLD AUTO: 6.4 K/UL (ref 4.8–10.8)

## 2017-04-25 PROCEDURE — 94760 N-INVAS EAR/PLS OXIMETRY 1: CPT

## 2017-04-25 PROCEDURE — 80061 LIPID PANEL: CPT

## 2017-04-25 PROCEDURE — 700111 HCHG RX REV CODE 636 W/ 250 OVERRIDE (IP): Performed by: STUDENT IN AN ORGANIZED HEALTH CARE EDUCATION/TRAINING PROGRAM

## 2017-04-25 PROCEDURE — 85025 COMPLETE CBC W/AUTO DIFF WBC: CPT

## 2017-04-25 PROCEDURE — 94640 AIRWAY INHALATION TREATMENT: CPT

## 2017-04-25 PROCEDURE — 99223 1ST HOSP IP/OBS HIGH 75: CPT | Performed by: INTERNAL MEDICINE

## 2017-04-25 PROCEDURE — 770006 HCHG ROOM/CARE - MED/SURG/GYN SEMI*

## 2017-04-25 PROCEDURE — 700105 HCHG RX REV CODE 258: Performed by: STUDENT IN AN ORGANIZED HEALTH CARE EDUCATION/TRAINING PROGRAM

## 2017-04-25 PROCEDURE — 83605 ASSAY OF LACTIC ACID: CPT

## 2017-04-25 PROCEDURE — 700101 HCHG RX REV CODE 250: Performed by: STUDENT IN AN ORGANIZED HEALTH CARE EDUCATION/TRAINING PROGRAM

## 2017-04-25 PROCEDURE — A9270 NON-COVERED ITEM OR SERVICE: HCPCS | Performed by: STUDENT IN AN ORGANIZED HEALTH CARE EDUCATION/TRAINING PROGRAM

## 2017-04-25 PROCEDURE — 80053 COMPREHEN METABOLIC PANEL: CPT

## 2017-04-25 PROCEDURE — 36415 COLL VENOUS BLD VENIPUNCTURE: CPT

## 2017-04-25 PROCEDURE — 700111 HCHG RX REV CODE 636 W/ 250 OVERRIDE (IP): Performed by: HOSPITALIST

## 2017-04-25 PROCEDURE — 700102 HCHG RX REV CODE 250 W/ 637 OVERRIDE(OP): Performed by: STUDENT IN AN ORGANIZED HEALTH CARE EDUCATION/TRAINING PROGRAM

## 2017-04-25 PROCEDURE — 700105 HCHG RX REV CODE 258: Performed by: HOSPITALIST

## 2017-04-25 RX ORDER — IPRATROPIUM BROMIDE AND ALBUTEROL SULFATE 2.5; .5 MG/3ML; MG/3ML
3 SOLUTION RESPIRATORY (INHALATION) 4 TIMES DAILY
Status: DISCONTINUED | OUTPATIENT
Start: 2017-04-26 | End: 2017-04-26

## 2017-04-25 RX ORDER — SODIUM CHLORIDE 9 MG/ML
1000 INJECTION, SOLUTION INTRAVENOUS CONTINUOUS
Status: DISCONTINUED | OUTPATIENT
Start: 2017-04-25 | End: 2017-04-26 | Stop reason: HOSPADM

## 2017-04-25 RX ORDER — SODIUM CHLORIDE 9 MG/ML
INJECTION, SOLUTION INTRAVENOUS CONTINUOUS
Status: DISCONTINUED | OUTPATIENT
Start: 2017-04-25 | End: 2017-04-25

## 2017-04-25 RX ORDER — PREDNISONE 10 MG/1
TABLET ORAL
Qty: 32 TAB | Refills: 0 | Status: SHIPPED | OUTPATIENT
Start: 2017-04-25 | End: 2017-04-26

## 2017-04-25 RX ORDER — SODIUM CHLORIDE 9 MG/ML
1000 INJECTION, SOLUTION INTRAVENOUS ONCE
Status: COMPLETED | OUTPATIENT
Start: 2017-04-25 | End: 2017-04-25

## 2017-04-25 RX ORDER — ALBUTEROL SULFATE 90 UG/1
2 AEROSOL, METERED RESPIRATORY (INHALATION) EVERY 4 HOURS PRN
Status: DISCONTINUED | OUTPATIENT
Start: 2017-04-25 | End: 2017-04-26 | Stop reason: HOSPADM

## 2017-04-25 RX ADMIN — CLOBETASOL PROPIONATE: 0.5 OINTMENT TOPICAL at 08:15

## 2017-04-25 RX ADMIN — DOXYCYCLINE 100 MG: 100 INJECTION, POWDER, LYOPHILIZED, FOR SOLUTION INTRAVENOUS at 08:15

## 2017-04-25 RX ADMIN — IPRATROPIUM BROMIDE AND ALBUTEROL SULFATE 3 ML: .5; 3 SOLUTION RESPIRATORY (INHALATION) at 06:09

## 2017-04-25 RX ADMIN — IPRATROPIUM BROMIDE AND ALBUTEROL SULFATE 3 ML: .5; 3 SOLUTION RESPIRATORY (INHALATION) at 02:46

## 2017-04-25 RX ADMIN — CLOBETASOL PROPIONATE: 0.5 OINTMENT TOPICAL at 20:11

## 2017-04-25 RX ADMIN — HYDROCODONE BITARTRATE AND ACETAMINOPHEN 1 TABLET: 10; 325 TABLET ORAL at 07:03

## 2017-04-25 RX ADMIN — PREDNISONE 40 MG: 20 TABLET ORAL at 08:15

## 2017-04-25 RX ADMIN — IPRATROPIUM BROMIDE AND ALBUTEROL SULFATE 3 ML: .5; 3 SOLUTION RESPIRATORY (INHALATION) at 22:36

## 2017-04-25 RX ADMIN — IPRATROPIUM BROMIDE AND ALBUTEROL SULFATE 3 ML: .5; 3 SOLUTION RESPIRATORY (INHALATION) at 19:22

## 2017-04-25 RX ADMIN — SODIUM CHLORIDE 1000 ML: 9 INJECTION, SOLUTION INTRAVENOUS at 05:23

## 2017-04-25 RX ADMIN — IPRATROPIUM BROMIDE AND ALBUTEROL SULFATE 3 ML: .5; 3 SOLUTION RESPIRATORY (INHALATION) at 14:16

## 2017-04-25 RX ADMIN — IPRATROPIUM BROMIDE AND ALBUTEROL SULFATE 3 ML: .5; 3 SOLUTION RESPIRATORY (INHALATION) at 10:03

## 2017-04-25 RX ADMIN — CLOBETASOL PROPIONATE: 0.5 OINTMENT TOPICAL at 00:38

## 2017-04-25 RX ADMIN — SODIUM CHLORIDE 1000 ML: 9 INJECTION, SOLUTION INTRAVENOUS at 04:24

## 2017-04-25 ASSESSMENT — COPD QUESTIONNAIRES
HAVE YOU SMOKED AT LEAST 100 CIGARETTES IN YOUR ENTIRE LIFE: NO/DON'T KNOW
DO YOU EVER COUGH UP ANY MUCUS OR PHLEGM?: YES, EVERY DAY
DURING THE PAST 4 WEEKS HOW MUCH DID YOU FEEL SHORT OF BREATH: MOST  OR ALL OF THE TIME
COPD SCREENING SCORE: 7

## 2017-04-25 ASSESSMENT — ENCOUNTER SYMPTOMS
HEADACHES: 0
WEAKNESS: 0
BLURRED VISION: 0
VOMITING: 0
FEVER: 0
TINGLING: 0
HEARTBURN: 0
ABDOMINAL PAIN: 0
NAUSEA: 0
PALPITATIONS: 0
SHORTNESS OF BREATH: 1
SORE THROAT: 0
CHILLS: 0

## 2017-04-25 ASSESSMENT — PAIN SCALES - GENERAL
PAINLEVEL_OUTOF10: 5
PAINLEVEL_OUTOF10: 0
PAINLEVEL_OUTOF10: 5

## 2017-04-25 ASSESSMENT — LIFESTYLE VARIABLES: EVER_SMOKED: NEVER

## 2017-04-25 NOTE — PROGRESS NOTES
[facility] Medicine Interval Note    Name Everardo Edwards     1955   Age/Sex 61 y.o. male   MRN [id number]   Code Status Full     After 5PM or if no immediate response to page, please call for cross-coverage  Attending/Team: Leno Call [phone] to page   1st Call - Day Intern (R1):   Mandi 2nd Call - Day Sr. Resident (R2/R3):   Rico         Chief complaint/ reason for interval visit (Primary Diagnosis)   Shortness of breath    Interval Problem Daily Status Update    - Pt reports improved breathing, able to speak without issues. VS stable  - Unsure of inciting asthma event  - Will d/c antibiotics and unsure if necessary  - Will continue oral prednisone  - Pt has not required oxygen, and was able to sleep at night  - Wheezes on exam  - Will ambulate today and see if improvement  - CTPE showed incidental finding of pulmonary nodules. Recommend outpatient follow-up in 12 months for repeat CT  - Will discharge home today if breathing has improved by this afternoon    Active Problems:    Asthma exacerbation POA: Yes    Psoriasis and similar disorder POA: Yes      Overview: Left knee cap    Skin rash POA: Yes      Overview: Back/front torso, macular blanching dots     Prolonged Q-T interval on ECG POA: Yes    Pulmonary nodule, right POA: Yes    Hypokalemia POA: Yes  Resolved Problems:    * No resolved hospital problems. *      Review of Systems   Constitutional: Negative for fever and chills.   HENT: Negative for hearing loss and sore throat.    Eyes: Negative for blurred vision.   Respiratory: Positive for shortness of breath.         Short of breath with exercise, more so than usual   Cardiovascular: Negative for chest pain and palpitations.   Gastrointestinal: Negative for heartburn, nausea, vomiting and abdominal pain.   Musculoskeletal: Negative for joint pain.   Skin: Positive for itching and rash.        Patient has plaque on left knee that is  severely itchy. Reports mild improvement with oral steroids, and worsening with cessation of steroids. Also complains of small, red, raised, itchy papules present throughout his torso and upper extremities. These presented after the rash on the leg, and also seem to get better with steroids.    Neurological: Negative for tingling, weakness and headaches.       Consultants/Specialty  None    Disposition  Discharge to home     Quality Measures  Labs reviewed and Medications reviewed  Caldwell catheter: No Caldwell      DVT Prophylaxis: Not indicated at this time, ambulatory              Physical Exam       Filed Vitals:    04/25/17 0400 04/25/17 0611 04/25/17 0710 04/25/17 1004   BP: 138/88  152/99    Pulse: 92 91 81 102   Temp: 36.9 °C (98.4 °F)  36.4 °C (97.6 °F)    Resp: 16 16 20 18   Height:       Weight:       SpO2: 92% 95% 92% 94%     Body mass index is 24.21 kg/(m^2). Weight: 74.4 kg (164 lb 0.4 oz)  Oxygen Therapy:  Pulse Oximetry: 94 %, O2 (LPM): 2, O2 Delivery: None (Room Air)    Physical Exam   Constitutional: He is well-developed, well-nourished, and in no distress. No distress.   HENT:   Head: Normocephalic and atraumatic.   Eyes: Conjunctivae and EOM are normal.   Neck: Normal range of motion. Neck supple.   Fatty neck tissue noted.   Cardiovascular: Normal rate, regular rhythm and normal heart sounds.    Pulmonary/Chest: Effort normal. He has wheezes.   Skin: Skin is warm. He is not diaphoretic. There is erythema.   Large, 6 cm lesion on left lateral knee. No induration, pus, or infection noted. Small scabs throughout lesion. Small, red, raised, papules present diffusely throughout the abdomen area. Skin noted to be red and blanching, concentrated in the upper thorax and neck region.         Lab Data Review:      4/25/2017  12:33 PM    Recent Labs      04/24/17   1706  04/25/17   0042   SODIUM  135  136   POTASSIUM  3.4*  4.0   CHLORIDE  101  105   CO2  23  22   BUN  15  13   CREATININE  0.84  0.69   CALCIUM   9.9  8.9       Recent Labs      04/24/17   1706  04/25/17   0042   ALTSGPT  36  33   ASTSGOT  30  25   ALKPHOSPHAT  87  70   TBILIRUBIN  0.8  0.5   GLUCOSE  146*  167*       Recent Labs      04/24/17   1706  04/25/17   0042   RBC  5.60  5.08   HEMOGLOBIN  16.6  15.2   HEMATOCRIT  49.3  44.9   PLATELETCT  347  332   PROTHROMBTM  15.4*   --    APTT  34.9   --    INR  1.18*   --        Recent Labs      04/24/17   1706  04/25/17   0042   WBC  9.6  6.4   NEUTSPOLYS  63.20  85.40*   LYMPHOCYTES  23.40  12.60*   MONOCYTES  9.30  1.10   EOSINOPHILS  3.10  0.00   BASOPHILS  0.50  0.30   ASTSGOT  30  25   ALTSGPT  36  33   ALKPHOSPHAT  87  70   TBILIRUBIN  0.8  0.5           Assessment/Plan     Acute Asthma exacerbation  - Wheezing on exam  - Increased albuterol use from zero puffs to 10 a day  - Prior URI possible inciting cause, pt also has cats and mice infestation at home  - Patient generally well controlled on inhaled daily corticosteroid  - Pt does not require home oxygen    Plan  - RT protocol  - Continue duonebs and albuterol prn  - Continue prednisone PO    Rash, unknown cause  - Diffuse, red, raised, macules on torso  - 6 cm plaque on left lateral knee, no drainage noted.  - Both noted to be itchy, but improves with oral steroids  - Erythema noted throughout the the upper thorax, circumferential around the neck, back, and chest. Blanches.     Plan  - PO predisone for asthma will help treat acutely  - Recommend outpatient follow up    Elevated lactic acid  - 2.8-->3.8  - Patient given ceftriaxone and azythromycin in the ED  - Patient also noted to be hypoxic (high 80's) in the ED    Plan  - Continue to monitor    Pulmonary Nodule  - Incidental CTPE finding  - 4 cm    Plan  -Outpatient follow-up

## 2017-04-25 NOTE — SENIOR ADMIT NOTE
"[facility] SENIOR ADMIT NOTE:      Patient ID:   Name:             Everardo Edwards   YOB: 1955  Age:                 61 y.o.  male   MRN:               [id number]                                                          Chief Complaint:       Shortness of breath, wheezing, skin rash     History of Present Illness:    Mr. Edwards is a 61 y.o. male with a PMhx of asthma, GERD who presented to ED for complains of worsening shortness of breath for the last 2 weeks, he was seen at [facility] last Wednesday he was treated wtih inhalations, medrol dose pack, antibiotics (probably azithromycin) and sent home. As he started to wean off antibiotics he started to feel short of breath, itching of skin rash worsened. He initially went to [facility] as there was a long wait time he decided to come here for further evaluation.     He states that he started to notice itching skin rash over the abdomen, legs, lower back, prednisone helped with rash in the beginning but as he weaned off it started to itch. States that his wife noticed some swelling of lips along with rash about 2 weeks ago however denied shortness of breath. He has a rash over the left knee, 4X4 cm, non-itching, associated with scaling, this is going on for the last 2 months. He was concerned if this rash is due to recent left knee replacement (10/2016). He also mentions that he is being evaluated for \"problems with clotting\". He was also concerned that all these symptoms are due to mice that he noticed at home.     He denies recent sick contacts, runny nose, flu like symptoms, abdominal pain, nausea, vomiting, weakness of extremities.     In ED he received one dose of ceftriaxone, azithromycin, one dose of solumedrol. CTPE negative for PE.     Active Ambulatory Problems     Diagnosis Date Noted   • Primary osteoarthritis of left knee 10/19/2016   • GERD (gastroesophageal reflux disease) 04/24/2017     Resolved Ambulatory Problems     " "Diagnosis Date Noted   • No Resolved Ambulatory Problems     Past Medical History   Diagnosis Date   • Arthritis    • Asthma    • Dental disorder    • Pain    • Anesthesia          Vitals:   Weight/BMI: Body mass index is 24.73 kg/(m^2).  Blood pressure 152/88, pulse 87, temperature 36.7 °C (98.1 °F), resp. rate 16, height 1.753 m (5' 9\"), weight 76 kg (167 lb 8.8 oz), SpO2 95 %.  Filed Vitals:    04/24/17 1649 04/24/17 1720 04/24/17 1800 04/24/17 2102   BP: 162/121   152/88   Pulse: 80 93 90 87   Temp:    36.7 °C (98.1 °F)   Resp: 18 18  16   Height:       Weight:       SpO2: 93% 95% 95%      Oxygen Therapy:  Pulse Oximetry: 95 %, O2 (LPM): 0, O2 Delivery: None (Room Air)   61 yrs old male sitting comfortably on bed   HEENT: no erythema, exudates, swelling, KIMI, EOM intact   RS: expiratory wheeze all over the lung fields, no rales or rhonchi   CVS: tachycardic, unable to appreciate murmur   ABD: soft, non tender, non distended, BS+  NEURO: no focal deficits   SKIN: less than 1 cm, blanching purpuric rash, scattered over abdomen, legs, lower back, scab in the center of rash, no bleeding     Assessment:  Acute hypoxic respiratory failure   Asthma exacerbation   Bronchitis   Lactic acidosis   Elevated DDimer   4 mm nodular density, periphery of upper lobes   Blanching purpuric rash   Psoriasis of left knee     PLAN:  Patient has asthma exacerbation - will be treated with ceftriaxone, doxycycline (prolonged QTc), lactic acid trending up. Continued on IVF. Prednisone 40 mg per day. Scheduled RT inhalations.       Please refer to Interns H&P for complete admission details.      "

## 2017-04-25 NOTE — CARE PLAN
Problem: Infection  Goal: Will remain free from infection  Outcome: PROGRESSING AS EXPECTED  IV abx for treatment of infection. Monitoring of vitals and labs.    Problem: Venous Thromboembolism (VTW)/Deep Vein Thrombosis (DVT) Prevention:  Goal: Patient will participate in Venous Thrombosis (VTE)/Deep Vein Thrombosis (DVT)Prevention Measures  Outcome: PROGRESSING AS EXPECTED  Patient placed on sq lovenox, ambulatory to the restroom.

## 2017-04-25 NOTE — CARE PLAN
Problem: Infection  Goal: Will remain free from infection  Outcome: PROGRESSING AS EXPECTED  Pt on antibiotic therapy as ordered for possible infection    Problem: Pain Management  Goal: Pain level will decrease to patient’s comfort goal  Outcome: PROGRESSING AS EXPECTED    04/25/17 0700   OTHER   Nurse Pain Scale 0 - 10  5   Comfort Goal Comfort at Rest   Pt getting pain medications as ordered as needed  Intervention: Educate and implement non-pharmacologic comfort measures. Examples: relaxation, distration, play therapy, activity therapy, massage, etc.    04/25/17 1448   OTHER   Intervention Medication (see MAR)

## 2017-04-25 NOTE — DISCHARGE PLANNING
Medical Social Work    MSW Renetta received a call from RN stating that pt's needs a lodging letter to stay at [facility].  This  faxed lodging letter to [facility] for pt's family member, Dolores Briones.

## 2017-04-25 NOTE — DISCHARGE SUMMARY
[facility] Discharge Summary      Admit Date:  4/24/2017       Discharge Date:   4/25/17    Service:   [facility] Internal Medicine Blue Team  Attending Physician(s):   Ministerio      Senior Resident(s):   Rico  Henrry Resident(s):   Mandi      Primary Diagnosis:   Asthma exacerbation.    Secondary Diagnoses:                Active Problems:    Asthma exacerbation POA: Yes    Psoriasis and similar disorder POA: Yes      Overview: Left knee cap    Skin rash POA: Yes      Overview: Back/front torso, macular blanching dots     Prolonged Q-T interval on ECG POA: Yes    Pulmonary nodule, right POA: Yes    Hypokalemia POA: Yes  Resolved Problems:    * No resolved hospital problems. *      Hospital Summary (Brief Narrative):       62 yo  male with hx of asthma controled with scheduled advair and prn albuterol presented with last 2 weeks duration of worsening SOB/yellowish sputum cough/cold-like symptoms. Patient had increased is albuterol use from zero puffs a day to 7-10 puffs a day. Denied the hx of COPD/BERTA/heart disease, smoking cigar hx, drinking, except remote hx of marijuana. Patient denied any known environmental allergens, but did note that he has cats and mice at home. Pt presented to ED in [place] last Wednesday and received course of oral steroids.     At the ED, patient became hypoxic to 87% and required oxygen NC with wheezes heard. Patient was afebrile and tachycardic (110) with hypertension of 171/94. VS stabilized after oxygen and breathing tx. Ddimer 825 and CTPE was negative for PE or active infiltrates. However, there were moderate emphysema with 4mm likely pulmonary nodules at the right peripheral border and secretions seen at the lower lobe of bronchus bilaterally. WBC not elevated, lactic acid 2.8. BNP/Trop wnl. Patient received soft diagnosis of possible pneumonia one dose of IV azithromax and rocephin was given.    EKG after azithromycin dose showed prolonged QT  interval. Patient was changed to doxycycline. Patient was admitted to the floor and showed marked improvement. Antibiotics were discontinued on day two.         Today, patient is  Better, no wheeze.    Patient /Hospital Summary (Details -- Problem Oriented) :          Assessment/Plan      Acute Asthma exacerbation  - Wheezing on exam  - Increased albuterol use from zero puffs to 10 a day  - Prior URI possible inciting cause, pt also has cats and mice infestation at home  - Patient generally well controlled on inhaled daily corticosteroid  - Pt does not require home oxygen    Plan  - RT protocol  - Continue duonebs and albuterol prn  - Continue prednisone PO    Rash, unknown cause  - Diffuse, red, raised, macules on torso  - 6 cm plaque on left lateral knee, no drainage noted.  - Both noted to be itchy, but improves with oral steroids  - Erythema noted throughout the the upper thorax, circumferential around the neck, back, and chest. Blanches.     Plan  - PO predisone for asthma will help treat acutely  - Recommend outpatient follow up    Elevated lactic acid  - 2.8-->3.8  - Patient given ceftriaxone and azythromycin in the ED  - Patient also noted to be hypoxic (high 80's) in the ED    Plan  - Continue to monitor    Pulmonary Nodule  - Incidental CTPE finding  - 4 cm    Plan  -Outpatient follow-up  With PCP.        Consultants:     None.    Procedures:        None.    Imaging/ Testing:      CTPE, Trops, DDimer, CXR, CBC, CMP, EKG, Lipid panel, Coags, TSH,     Discharge Medications:           Medication Reconciliation.     Medication List      START taking these medications       Instructions    predniSONE 10 MG Tabs   Last time this was given:  40 mg on 4/25/2017  8:15 AM   Commonly known as:  DELTASONE    Will need to taper your steroid dose pending when you see your dermatologist. 40mg x 1 day. 26th April 30mg x 4 days.27th - 30th April 20mg x 4days. 31st - 03 may 10mg x 4days .04th - 7th may. 5mg x 4days.  8th -  12th may         CONTINUE taking these medications       Instructions    albuterol 108 (90 BASE) MCG/ACT Aers inhalation aerosol   Last time this was given:  2 Puffs on 4/24/2017 11:26 PM    Inhale 2 Puffs by mouth every 6 hours as needed for Shortness of Breath.   Dose:  2 Puff       cyclobenzaprine 10 MG Tabs   Commonly known as:  FLEXERIL    Take 10 mg by mouth 3 times a day as needed.   Dose:  10 mg       fluticasone-salmeterol 100-50 MCG/DOSE Aepb   Commonly known as:  ADVAIR    Inhale 1 Puff by mouth every day.   Dose:  1 Puff       hydrocodone/acetaminophen  MG Tabs   Last time this was given:  1 Tab on 4/25/2017  7:03 AM   Commonly known as:  NORCO    Take 1-2 Tabs by mouth every four hours as needed for Moderate Pain.   Dose:  1-2 Tab       MUCINEX PO    Take 15-30 mL by mouth 2 times a day as needed.   Dose:  15-30 mL         STOP taking these medications          amoxicillin-clavulanate 875-125 MG Tabs   Commonly known as:  AUGMENTIN       azithromycin 250 MG Tabs   Commonly known as:  ZITHROMAX       MethylPREDNISolone 4 MG Tbpk   Commonly known as:  MEDROL DOSEPAK                  Disposition:   home    Diet:   Regular diet    Activity:   As tolerated    Instructions:      The patient was instructed to return to the ER in the event of worsening symptoms. I have counseled the patient on the importance of compliance and the patient has agreed to proceed with all medical recommendations and follow up plan indicated above.   The patient understands that all medications come with benefits and risks. Risks may include permanent injury or death and these risks can be minimized with close reassessment and monitoring.      There was a 4mm Pulmonary nodule seen on CT scan of your chest. Please follow up with your PCP for scheduled repeat CT of the chest to reassess the size of the nodule.  Follow up with your dermatologist for the skin lesions on your left leg.    Primary Care Provider:      Discharge  summary faxed to primary care provider: Defered  Copy of discharge summary given to the patient: Defered    Follow up appointment details :        Patient to schedule appointment with his PCP( 05/28) and follow up of pulmunary nodule seen on CT chest.  Patient to schedule follow up appointment with his dermatologist on 05/10/17      Pending Studies:          None    Time spent on discharge day patient visit, preparing discharge paperwork and arranging for patient follow up.    Summary of follow up issues:     Pulmonary nodule seen on CT chest. Follow up with PCP for possible CT later  to assess the size of the nodules. Appointment is scheduled for 04/28/17. Also to follow up with  Oxygen requirement. as patient is dyspneic on exertion at baseline.    Follow up with dermatologist  on 05/10/17.    Discharge Time (Minutes) :  >45mins      Condition on Discharge.  Interval history/exam for day of discharge:      Patient feeling better.  No wheeze.    Filed Vitals:    04/25/17 0611 04/25/17 0710 04/25/17 1004 04/25/17 1416   BP:  152/99     Pulse: 91 81 102 67   Temp:  36.4 °C (97.6 °F)     Resp: 16 20 18 18   Height:       Weight:       SpO2: 95% 92% 94% 97%     Weight/BMI: Body mass index is 24.21 kg/(m^2).  Pulse Oximetry: 97 %, O2 (LPM): 2, O2 Delivery: None (Room Air)    General:  He is well-developed, well-nourished, and in no distress. No distress  CVS: S1 S2 no mummur, rubs orgallops.  PULM: Good bilateral air entry, No wheeze  GI : No tenderness, BS presents.  Skin: Skin is warm. He is not diaphoretic. There is erythema.   Large, 6 cm lesion on left lateral knee. No induration, pus, or infection noted. Small scabs throughout lesion. Small, red, raised, papules present diffusely throughout the abdomen area. Skin noted to be red and blanching, concentrated in the upper thorax and neck region.     Most Recent Labs:    Lab Results   Component Value Date/Time    WBC 6.4 04/25/2017 12:42 AM    RBC 5.08 04/25/2017  12:42 AM    HEMOGLOBIN 15.2 04/25/2017 12:42 AM    HEMATOCRIT 44.9 04/25/2017 12:42 AM    MCV 88.4 04/25/2017 12:42 AM    MCH 29.9 04/25/2017 12:42 AM    MCHC 33.9 04/25/2017 12:42 AM    MPV 8.8* 04/25/2017 12:42 AM    NEUTROPHILS-POLYS 85.40* 04/25/2017 12:42 AM    LYMPHOCYTES 12.60* 04/25/2017 12:42 AM    MONOCYTES 1.10 04/25/2017 12:42 AM    EOSINOPHILS 0.00 04/25/2017 12:42 AM    BASOPHILS 0.30 04/25/2017 12:42 AM      Lab Results   Component Value Date/Time    SODIUM 136 04/25/2017 12:42 AM    POTASSIUM 4.0 04/25/2017 12:42 AM    CHLORIDE 105 04/25/2017 12:42 AM    CO2 22 04/25/2017 12:42 AM    GLUCOSE 167* 04/25/2017 12:42 AM    BUN 13 04/25/2017 12:42 AM    CREATININE 0.69 04/25/2017 12:42 AM      Lab Results   Component Value Date/Time    ALT(SGPT) 33 04/25/2017 12:42 AM    AST(SGOT) 25 04/25/2017 12:42 AM    ALKALINE PHOSPHATASE 70 04/25/2017 12:42 AM    TOTAL BILIRUBIN 0.5 04/25/2017 12:42 AM    ALBUMIN 3.8 04/25/2017 12:42 AM    GLOBULIN 3.1 04/25/2017 12:42 AM    INR 1.18* 04/24/2017 05:06 PM     Lab Results   Component Value Date/Time    PT 15.4* 04/24/2017 05:06 PM    INR 1.18* 04/24/2017 05:06 PM

## 2017-04-25 NOTE — ED NOTES
The Medication Reconciliation process has been completed by interviewing the patient and calling his pharmacy for ABX information    Allergies have been reviewed  Antibiotic use in 30 days - Augmentin and Zpack    Home Pharmacy:  [facility] - [place]

## 2017-04-25 NOTE — PROGRESS NOTES
2 RN skin check with Gabbi. Generalized Rash and redness to back and trunk, itching per patient. Flaky, dry and itching, red rash localized to left knee. Patient turns self from side to side.

## 2017-04-25 NOTE — PROGRESS NOTES
Received pt from night rn. Medications given. Assessment completed. Pt alert and oriented times 4. Pt ambulated with staff in hallway. Pt saturations decreased to 87% on room air. After ambulating pt sat at side of bed and recovered with no difficulty. MD on floor and aware of pt desaturations orders to follow up with pcp at discharge.Pt resting in bed. Family at bedside. Possible discharge home today. No other concerns    [phone]-paged MD about discharge confirmation. Discharged discontinued.

## 2017-04-25 NOTE — PROGRESS NOTES
Patient oriented x4, arrived to unit from ER. Standby assistance, steady gait. Denies pain. Admission profile completed. Updated on POC, verbalizes understanding. IV bolus and abx given. Sputum sample collected. Educated on call light. Home medications brought to pharmacy. Call light in reach, rounding implemented.

## 2017-04-25 NOTE — H&P
[facility] Medicine Admitting History and Physical    Name Everardo Edwards     1955   Age/Sex 61 y.o. male   MRN [id number]   Code Status FULL     After 5PM or if no immediate response to page, please call for cross-coverage  Attending/Team: Dr. Lakhani/Gregory Call [phone] to page   1st Call - Day Intern (R1):   Dr. Raymond 2nd Call - Day Sr. Resident (R2/R3):   Dr. Gómez       Chief Complaint:  Asthma exacerbation with skin rash     HPI:  60 yo  male with hx of asthma (usually use once in the morning PRN with albuterol), osteoarthritis of left knee, s/p TKR in 10/2016, and c-spine, s/p fusion 2017 and chronic headache (on norco/flexiril per patient), presented with last 2 weeks duration of worsening SOB/yellowish sputum cough/cold-like symptoms. Patient also complained the lip swelling with itching hives/skin rash of multiple little spots on back/front torso. Benadryl seemed to help a bit, however. Patient also stated that he has had psoriatic-like lesion on the left knee cap and applied anti-fungal ointment which didn't seem to help. Patient usually use one time use of albuterol in the morning PRN, but he had to increase the frequency of using on albuterol multiple times throughout the day. Denied the hx of COPD/BERTA/heart disease, smoking cigar hx, drinking, except remote hx of marijuana. Patient denied any known allergens to environments, medications or drugs except for cats which he doesn't have at home. He said he has mice at his home sometimes recently, however. Lip swelling/rash seemed to be gotten better over the time, but the sob/cough has been consistent that he had to go to the ED in [place] last Wednesday where he got dose pack of steroid which didn't help him much at all. Denied fever, chills, chest pain, nausea, vomiting, diarrhea, abdominal pain or recent abx use. Patient went to [place] 2 months ago and denied any bumping into plants anything like  that.    At the ED, patient became hypoxic to 87% and required oxygen NC with wheezes heard. Afebrile. Tachycardic at 110 with hypertension of 171/94, but stabilized after oxygen and breathing tx. Ddimer 825 and CTPE was negative for PE or active infiltrates. However, there were moderate emphysema with 4mm likely pulmonary nodules at the right peripheral border and secretions seen at the lower lobe of bronchus bilaterally. Normal CBC and CMP except K 3.4 and lactic acid 2.8. BNP/Trop wnl. Patient received one dose of IV azithromax and rocephine.         Review of Systems   Constitutional: Negative for fever, chills and diaphoresis.   HENT: Negative for congestion and sore throat.    Eyes: Negative for blurred vision and double vision.   Respiratory: Positive for cough, sputum production, shortness of breath and wheezing. Negative for hemoptysis.    Cardiovascular: Negative for chest pain, palpitations and leg swelling.   Gastrointestinal: Positive for heartburn. Negative for nausea, vomiting, abdominal pain, diarrhea, constipation and blood in stool.   Genitourinary: Negative for dysuria and urgency.   Musculoskeletal: Positive for back pain. Negative for falls.   Skin: Positive for itching and rash.   Neurological: Positive for headaches. Negative for dizziness, sensory change, focal weakness and weakness.   Endo/Heme/Allergies: Does not bruise/bleed easily.   Psychiatric/Behavioral: Negative for substance abuse. The patient is not nervous/anxious.              Past Medical History:   Past Medical History   Diagnosis Date   • Arthritis    • Asthma    • Dental disorder      upper partial denture   • Pain      neck and left knee   • Anesthesia      PONV       Past Surgical History:  Past Surgical History   Procedure Laterality Date   • Sinusotomies  1989   • Knee arthroscopy Left 2007   • Acl reconstruction Left 1985   • Other surgical procedure  2001     Anterior Cervical fusion   • Knee arthroplasty total Left  "10/19/2016     Procedure: KNEE ARTHROPLASTY TOTAL;  Surgeon: Zuleika Almodovar M.D.;  Location: SURGERY [facility];  Service:        Current Outpatient Medications:  Home Medications     Reviewed by Sharon Jane (Pharmacy Tech) on 04/24/17 at 1955  Med List Status: Complete    Medication Last Dose Status    albuterol 108 (90 BASE) MCG/ACT Aero Soln inhalation aerosol 4/24/2017 Active    amoxicillin-clavulanate (AUGMENTIN) 875-125 MG Tab 4/15/2017 Active    azithromycin (ZITHROMAX) 250 MG Tab 4/23/2017 Active    cyclobenzaprine (FLEXERIL) 10 MG Tab 4/22/2017 Active    fluticasone-salmeterol (ADVAIR) 100-50 MCG/DOSE AEROSOL POWDER, BREATH ACTIVATED 4/24/2017 Active    GuaiFENesin (MUCINEX PO) 4/24/2017 Active    hydrocodone/acetaminophen (NORCO)  MG Tab 4/22/2017 Active    MethylPREDNISolone (MEDROL DOSEPAK) 4 MG Tablet Therapy Pack 4/24/2017 Active                Medication Allergy/Sensitivities:  No Known Allergies      Family History:  No family history on file.    Social History:  Social History     Social History   • Marital Status:      Spouse Name: N/A   • Number of Children: N/A   • Years of Education: N/A     Occupational History   • Not on file.     Social History Main Topics   • Smoking status: Never Smoker    • Smokeless tobacco: Never Used   • Alcohol Use: No   • Drug Use: No   • Sexual Activity: Not on file     Other Topics Concern   • Not on file     Social History Narrative     Living situation: [place], [place]  PCP : Caleb Sifuentes M.D.    Physical Exam     Filed Vitals:    04/24/17 1648 04/24/17 1649 04/24/17 1720 04/24/17 1800   BP:  162/121     Pulse: 116 80 93 90   Temp:       Resp:  18 18    Height:       Weight:       SpO2: 93% 93% 95% 95%     Body mass index is 24.73 kg/(m^2).  /121 mmHg  Pulse 90  Temp(Src) 36.9 °C (98.4 °F)  Resp 18  Ht 1.753 m (5' 9\")  Wt 76 kg (167 lb 8.8 oz)  BMI 24.73 kg/m2  SpO2 95%  O2 therapy: Pulse Oximetry: 95 %, O2 (LPM): 0, " O2 Delivery: None (Room Air)      Physical Exam   Constitutional: He is oriented to person, place, and time. No distress.   HENT:   Head: Normocephalic and atraumatic.   Mouth/Throat: No oropharyngeal exudate.   Eyes: Conjunctivae and EOM are normal. Pupils are equal, round, and reactive to light.   Neck: Normal range of motion. No JVD present.   Stiff neck due to fusion    Cardiovascular: Normal rate, regular rhythm and normal heart sounds.  Exam reveals no gallop and no friction rub.    No murmur heard.  Pulmonary/Chest: No stridor. No respiratory distress. He has wheezes.   Not using accessory muscles. Mild exhalation wheezes noted throughout the lung.   Abdominal: Soft. Bowel sounds are normal. He exhibits no distension and no mass. There is no tenderness. There is no rebound and no guarding.   Musculoskeletal: He exhibits no edema or tenderness.   Left knee has post-surgical skin changes from TKR   Neurological: He is alert and oriented to person, place, and time.   Skin: Skin is warm. Rash noted. He is not diaphoretic. No pallor.   Multiple red macular-papular spots of circumferential 1 inch in both front/back torso, blanchable, no discharge noted. Left knee cap had scaly, dry salmon-colored plaques noted.   Psychiatric: Mood normal.           Data Review       Old Records Request:   Completed  Current Records review and summary: Completed    Lab Data Review:  Recent Results (from the past 24 hour(s))   CBC WITH DIFFERENTIAL    Collection Time: 04/24/17  5:06 PM   Result Value Ref Range    WBC 9.6 4.8 - 10.8 K/uL    RBC 5.60 4.70 - 6.10 M/uL    Hemoglobin 16.6 14.0 - 18.0 g/dL    Hematocrit 49.3 42.0 - 52.0 %    MCV 88.0 81.4 - 97.8 fL    MCH 29.6 27.0 - 33.0 pg    MCHC 33.7 33.7 - 35.3 g/dL    RDW 43.4 35.9 - 50.0 fL    Platelet Count 347 164 - 446 K/uL    MPV 9.0 9.0 - 12.9 fL    Neutrophils-Polys 63.20 44.00 - 72.00 %    Lymphocytes 23.40 22.00 - 41.00 %    Monocytes 9.30 0.00 - 13.40 %    Eosinophils 3.10  0.00 - 6.90 %    Basophils 0.50 0.00 - 1.80 %    Immature Granulocytes 0.50 0.00 - 0.90 %    Nucleated RBC 0.00 /100 WBC    Neutrophils (Absolute) 6.05 1.82 - 7.42 K/uL    Lymphs (Absolute) 2.24 1.00 - 4.80 K/uL    Monos (Absolute) 0.89 (H) 0.00 - 0.85 K/uL    Eos (Absolute) 0.30 0.00 - 0.51 K/uL    Baso (Absolute) 0.05 0.00 - 0.12 K/uL    Immature Granulocytes (abs) 0.05 0.00 - 0.11 K/uL    NRBC (Absolute) 0.00 K/uL   COMP METABOLIC PANEL    Collection Time: 04/24/17  5:06 PM   Result Value Ref Range    Sodium 135 135 - 145 mmol/L    Potassium 3.4 (L) 3.6 - 5.5 mmol/L    Chloride 101 96 - 112 mmol/L    Co2 23 20 - 33 mmol/L    Anion Gap 11.0 0.0 - 11.9    Glucose 146 (H) 65 - 99 mg/dL    Bun 15 8 - 22 mg/dL    Creatinine 0.84 0.50 - 1.40 mg/dL    Calcium 9.9 8.5 - 10.5 mg/dL    AST(SGOT) 30 12 - 45 U/L    ALT(SGPT) 36 2 - 50 U/L    Alkaline Phosphatase 87 30 - 99 U/L    Total Bilirubin 0.8 0.1 - 1.5 mg/dL    Albumin 4.3 3.2 - 4.9 g/dL    Total Protein 7.7 6.0 - 8.2 g/dL    Globulin 3.4 1.9 - 3.5 g/dL    A-G Ratio 1.3 g/dL   TROPONIN    Collection Time: 04/24/17  5:06 PM   Result Value Ref Range    Troponin I <0.01 0.00 - 0.04 ng/mL   PROTHROMBIN TIME    Collection Time: 04/24/17  5:06 PM   Result Value Ref Range    PT 15.4 (H) 12.0 - 14.6 sec    INR 1.18 (H) 0.87 - 1.13   APTT    Collection Time: 04/24/17  5:06 PM   Result Value Ref Range    APTT 34.9 24.7 - 36.0 sec   D-DIMER    Collection Time: 04/24/17  5:06 PM   Result Value Ref Range    D-Dimer Screen 825 (H) <250 ng/mL(D-DU)   BTYPE NATRIURETIC PEPTIDE    Collection Time: 04/24/17  5:06 PM   Result Value Ref Range    B Natriuretic Peptide 19 0 - 100 pg/mL   ESTIMATED GFR    Collection Time: 04/24/17  5:06 PM   Result Value Ref Range    GFR If African American >60 >60 mL/min/1.73 m 2    GFR If Non African American >60 >60 mL/min/1.73 m 2   LACTIC ACID    Collection Time: 04/24/17  7:47 PM   Result Value Ref Range    Lactic Acid 2.8 (H) 0.5 - 2.0 mmol/L        Imaging/Procedures Review:    ndependant Imaging Review: Completed  CT-CTA CHEST PULMONARY ARTERY W/ RECONS   Final Result      1.  No CT evidence for pulmonary emboli.   2.  No pneumonia or pneumothorax.   3.  Moderate emphysema.   4.  Secretions are present within the lower lobe bronchi on both sides, concerning for developing pneumonia.   5.  Ill-defined nodular densities in the periphery of the upper lobes, potentially inflammatory.  Follow-up recommended to ensure resolution.      Fleischner Society Guideline (Radiology 237:2005) pulmonary nodule recommendations( >4-6 mm):      For low risk patients, follow-up at 12 months.  If no change, no further imaging is needed.   For high risk patients, initial follow-up CT at 6-12 months and then at 18-24 months if no change.         DX-CHEST-PORTABLE (1 VIEW)   Final Result      Central bronchial wall thickening compatible with viral respiratory infection and/or reactive airways disease most commonly.      Nodular density over the right lower lung zone most likely represents a nipple shadow.  Consider repeat radiograph with nipple markers to confirm.           EKG:   EKG Independant Review: Completed  QTc:501, HR: 87, Normal Sinus Rhythm, no ST/T changes, prolonged QTc.    (x) Records reviewed and summarized in current documentation         Assessment/Plan     # Acute asthma exacerbation  # Lactic acidosis, could be from early signs of pneumonia  - recent 2 weeks hx of skin rash/hives/itchiness/sob/productive cough/recent increase in frequency of using inhalers multiple times throughout the day vs. One time prn usually --> concerning for asthma exacerbation (althought, from history, patient does not seem to have any hx of allergens to anything he knows of other than cats which he doesn't have)  - one episode of hypoxia to 87% at ED with tachycardic/hypertensive/wheezing --> required oxygen NC/breathing tx --> ddimer 825 with CTPE negative for PE or active  infiltrates. But, there were secretions seen at the lower lobe of bronchus, bilaterally. ddimer elevation could be from body demand as he had hypoxic episode.  - Normal CBC and CMP except K 3.4 and lactic acid 2.8. BNP/Trop wnl. Patient received one dose of IV azithromax and rocephine once.  - clinically, on exam, wheezes throughout +, not in acute distress, vitals were all stable after breathing tx and oxygen without any overt infection or sepsis  PLANS:  - cont RT/oxygen/albuterol/duoneb scheduled while patient awaken/oral prednisone 40 qd  - resume IV abx with rocephin and doxycycline and day team to switch to PO regimen once stable with long tapering of oral prednisone as patient may have early signs of pneumonia  - Blood cx and sputum cx pending and tailor abx based upon the results  - ordered 1 L IVF ns bolus and trend lactic acid       # Skin rash   - Multiple red macular-papular spots of circumferential 1 inch in both front/back torso, blanchable, no discharge noted -- can be itchy  - could be viral related or allergy related rash? or eczema? (no clear etiology from the history -- no obvious allergens, infections, travel hx, food hx, or new medication changes)   - benadryl prn and ctm      # Right pulmonary nodule   - 4mm at the right periphery of the lung border  - could be inflammatory or reactive  - recommend to follow with Ct in 12 months, if no changes, no further imaging required       # Hypokalemia   - K 3.4  - replete      # Prolonged Qt  - QTc 501 with NSR from EKG  - avoid any agents that may prolong Qtc (changed azithro to doxy IV)      # Psoriasis of the left knee   - tried anti-fungal cream with no improvement  - will start clobetasol ointment and monitor improvement  - patient will follow up with dermatology next week or so       # GERD  - cont omeprazole 20 qd po, stable        Disposition: Inpatient management for asthma exacerbation     Anticipated Hospital stay:  >2 midnights    Quality  Measures  EKG reviewed, Labs reviewed, Medications reviewed and Radiology images reviewed  Caldwell catheter: No Caldwell      DVT Prophylaxis: Enoxaparin (Lovenox)    Ulcer prophylaxis: Yes  Antibiotics: Treating active infection/contamination beyond 24 hours perioperative coverage

## 2017-04-26 VITALS
SYSTOLIC BLOOD PRESSURE: 149 MMHG | HEART RATE: 98 BPM | TEMPERATURE: 97.6 F | HEIGHT: 69 IN | DIASTOLIC BLOOD PRESSURE: 95 MMHG | RESPIRATION RATE: 19 BRPM | WEIGHT: 164.02 LBS | OXYGEN SATURATION: 93 % | BODY MASS INDEX: 24.29 KG/M2

## 2017-04-26 LAB
BACTERIA SPEC RESP CULT: NORMAL
LACTATE BLD-SCNC: 2.7 MMOL/L (ref 0.5–2)
PROCALCITONIN SERPL-MCNC: 0.07 NG/ML
SIGNIFICANT IND 70042: NORMAL
SITE SITE: NORMAL
SOURCE SOURCE: NORMAL

## 2017-04-26 PROCEDURE — 700111 HCHG RX REV CODE 636 W/ 250 OVERRIDE (IP): Performed by: STUDENT IN AN ORGANIZED HEALTH CARE EDUCATION/TRAINING PROGRAM

## 2017-04-26 PROCEDURE — 83605 ASSAY OF LACTIC ACID: CPT

## 2017-04-26 PROCEDURE — 94640 AIRWAY INHALATION TREATMENT: CPT

## 2017-04-26 PROCEDURE — 36415 COLL VENOUS BLD VENIPUNCTURE: CPT

## 2017-04-26 PROCEDURE — A9270 NON-COVERED ITEM OR SERVICE: HCPCS | Performed by: STUDENT IN AN ORGANIZED HEALTH CARE EDUCATION/TRAINING PROGRAM

## 2017-04-26 PROCEDURE — 94760 N-INVAS EAR/PLS OXIMETRY 1: CPT

## 2017-04-26 PROCEDURE — 700101 HCHG RX REV CODE 250: Performed by: STUDENT IN AN ORGANIZED HEALTH CARE EDUCATION/TRAINING PROGRAM

## 2017-04-26 PROCEDURE — 99238 HOSP IP/OBS DSCHRG MGMT 30/<: CPT | Mod: GC | Performed by: INTERNAL MEDICINE

## 2017-04-26 PROCEDURE — 700102 HCHG RX REV CODE 250 W/ 637 OVERRIDE(OP): Performed by: STUDENT IN AN ORGANIZED HEALTH CARE EDUCATION/TRAINING PROGRAM

## 2017-04-26 RX ORDER — IPRATROPIUM BROMIDE AND ALBUTEROL SULFATE 2.5; .5 MG/3ML; MG/3ML
3 SOLUTION RESPIRATORY (INHALATION)
Status: DISCONTINUED | OUTPATIENT
Start: 2017-04-26 | End: 2017-04-26 | Stop reason: HOSPADM

## 2017-04-26 RX ORDER — PREDNISONE 10 MG/1
TABLET ORAL
Qty: 30 TAB | Refills: 0 | Status: SHIPPED | OUTPATIENT
Start: 2017-04-26 | End: 2017-05-12

## 2017-04-26 RX ADMIN — IPRATROPIUM BROMIDE AND ALBUTEROL SULFATE 3 ML: .5; 3 SOLUTION RESPIRATORY (INHALATION) at 14:47

## 2017-04-26 RX ADMIN — OMEPRAZOLE 20 MG: 20 CAPSULE, DELAYED RELEASE ORAL at 07:35

## 2017-04-26 RX ADMIN — PREDNISONE 40 MG: 20 TABLET ORAL at 07:35

## 2017-04-26 RX ADMIN — IPRATROPIUM BROMIDE AND ALBUTEROL SULFATE 3 ML: .5; 3 SOLUTION RESPIRATORY (INHALATION) at 11:23

## 2017-04-26 RX ADMIN — IPRATROPIUM BROMIDE AND ALBUTEROL SULFATE 3 ML: .5; 3 SOLUTION RESPIRATORY (INHALATION) at 07:31

## 2017-04-26 RX ADMIN — CLOBETASOL PROPIONATE 1 APPLICATION: 0.5 OINTMENT TOPICAL at 07:35

## 2017-04-26 RX ADMIN — ENOXAPARIN SODIUM 40 MG: 100 INJECTION SUBCUTANEOUS at 07:35

## 2017-04-26 ASSESSMENT — ENCOUNTER SYMPTOMS
DIZZINESS: 0
COUGH: 0
SHORTNESS OF BREATH: 1
HEARTBURN: 0
FEVER: 0
WEIGHT LOSS: 1
VOMITING: 0
HEADACHES: 0
ORTHOPNEA: 1
ABDOMINAL PAIN: 0
WHEEZING: 0
DEPRESSION: 0
SPUTUM PRODUCTION: 0
NAUSEA: 0
BLURRED VISION: 0
CHILLS: 0
NERVOUS/ANXIOUS: 0

## 2017-04-26 ASSESSMENT — PAIN SCALES - GENERAL: PAINLEVEL_OUTOF10: 0

## 2017-04-26 NOTE — DISCHARGE INSTRUCTIONS
Discharge Instructions    Discharged to home by car with relative. Discharged via wheelchair, hospital escort: Yes.  Special equipment needed: Not Applicable    Be sure to schedule a follow-up appointment with your primary care doctor or any specialists as instructed.     Discharge Plan:   Diet Plan: Discussed  Activity Level: Discussed  Confirmed Follow up Appointment: Appointment Scheduled  Confirmed Symptoms Management: Discussed  Medication Reconciliation Updated: Yes  Influenza Vaccine Indication: Patient Refuses    I understand that a diet low in cholesterol, fat, and sodium is recommended for good health. Unless I have been given specific instructions below for another diet, I accept this instruction as my diet prescription.   Other diet: regular    Special Instructions: Please remind patient to follow up his scheduled appointment with his PCP 04/28/17 and dermatologist 05/10/17      · Is patient discharged on Warfarin / Coumadin?   No     · Is patient Post Blood Transfusion?  No    Depression / Suicide Risk    As you are discharged from this [facility] facility, it is important to learn how to keep safe from harming yourself.    Recognize the warning signs:  · Abrupt changes in personality, positive or negative- including increase in energy   · Giving away possessions  · Change in eating patterns- significant weight changes-  positive or negative  · Change in sleeping patterns- unable to sleep or sleeping all the time   · Unwillingness or inability to communicate  · Depression  · Unusual sadness, discouragement and loneliness  · Talk of wanting to die  · Neglect of personal appearance   · Rebelliousness- reckless behavior  · Withdrawal from people/activities they love  · Confusion- inability to concentrate     If you or a loved one observes any of these behaviors or has concerns about self-harm, here's what you can do:  · Talk about it- your feelings and reasons for harming yourself  · Remove any means  that you might use to hurt yourself (examples: pills, rope, extension cords, firearm)  · Get professional help from the community (Mental Health, Substance Abuse, psychological counseling)  · Do not be alone:Call your Safe Contact- someone whom you trust who will be there for you.  · Call your local CRISIS HOTLINE 400-2699 or 316-565-5470  · Call your local Children's Mobile Crisis Response Team [place] (589) 362-3972 or www.Dispatch  · Call the toll free National Suicide Prevention Hotlines   · National Suicide Prevention Lifeline 306-679-CRBC (6121)  · National Hope Line Network 800-SUICIDE (371-0515)

## 2017-04-26 NOTE — DISCHARGE PLANNING
Care Transition Team Assessment    Information Source  Orientation : Oriented x 4  Information Given By: Patient  Who is responsible for making decisions for patient? : Patient    Readmission Evaluation  Is this a readmission?: Yes - unplanned readmission  Why do you think you were readmitted?:  (Asthma)  Was an appointment arranged for you prior to discharge?: Yes, attended appointment  Were there new prescriptions you were supposed to fill after you were discharged?: Yes, prescriptions filled  Did you understand your discharge instructions?: Yes  Did you have enough support after your last discharge?: Yes    Elopement Risk  Legal Hold: No  Ambulatory or Self Mobile in Wheelchair: Yes  Disoriented: No  Psychiatric Symptoms: None  History of Wandering: No  Elopement this Admit: No  Vocalizing Wanting to Leave: No  Displays Behaviors, Body Language Wanting to Leave: No-Not at Risk for Elopement  Elopement Risk: Not at Risk for Elopement    Interdisciplinary Discharge Planning  Does Admitting Nurse Feel This Could be a Complex Discharge?: No  Primary Care Physician: Dr. Sifuentes in [place]  Lives with - Patient's Self Care Capacity: Spouse  Support Systems: [employer], Family Member(s), Friends / Neighbors  Housing / Facility: [address] [facility]  Do You Take your Prescribed Medications Regularly: Yes  Able to Return to Previous ADL's: Yes  Mobility Issues: No  Prior Services: None  Patient Expects to be Discharged to:: home  Assistance Needed: No  Durable Medical Equipment: Not Applicable    Discharge Preparedness  What is your plan after discharge?: Other (comment) (Home)  What are your discharge supports?: Spouse  Prior Functional Level: Ambulatory, Drives Self, Independent with Activities of Daily Living, Independent with Medication Management  Difficulity with ADLs: None  Difficulity with IADLs: None    Functional Assesment  Prior Functional Level: Ambulatory, Drives Self, Independent with Activities of  Daily Living, Independent with Medication Management    Finances  Financial Barriers to Discharge: No  Prescription Coverage: Yes    Vision / Hearing Impairment  Vision Impairment : Yes  Right Eye Vision: Wears Glasses  Left Eye Vision: Wears Glasses  Hearing Impairment : No    Values / Beliefs / Concerns  Values / Beliefs Concerns : No    Advance Directive  Advance Directive?: None  Advance Directive offered?: AD Booklet refused    Domestic Abuse  Have you ever been the victim of abuse or violence?: No    Psychological Assessment  History of Substance Abuse: None  History of Psychiatric Problems: No  Non-compliant with Treatment: No  Newly Diagnosed Illness: No    Discharge Risks or Barriers  Discharge risks or barriers?: No    Anticipated Discharge Information  Anticipated discharge disposition: Home  Discharge Address:  ([id number] [place] [address])  Discharge Contact Phone Number:  ([phone])

## 2017-04-26 NOTE — PROGRESS NOTES
[facility] – [place] Internal Medicine Interval Note    Name Everardo Edwards     1955   Age/Sex 61 y.o. male   MRN [id number]   Code Status Full     After 5PM or if no immediate response to page, please call for cross-coverage  Attending/Team: Ministerio/Gregory Call [phone] to page   1st Call - Day Intern (R1):   Mandi 2nd Call - Day Sr. Resident (R2/R3):   Rico       ID: This is a 61 year old  male patient with long standing hx of Chr Asthma admitted with Ac exacerbation and suspicion of Pneumonia:      Chief complaint/ reason for interval visit (Primary Diagnosis)   Shortness of breath    Interval Problem Daily Status Update      Pt feeling much improvement after admission, and received in the ED IV C3, Azithro, and Methyprednisolone.  He does not report any sick contacts . However he gives hx of Mice infestation in the house.    Given the absence of leucocytosis, and CXR clear and no SIRS criteria, and saturating well on room air,  we decided to discontinue antibiotics.      Active Problems:    Asthma exacerbation POA: Yes    Psoriasis and similar disorder POA: Yes      Overview: Left knee cap    Skin rash POA: Yes      Overview: Back/front torso, macular blanching dots     Prolonged Q-T interval on ECG POA: Yes    Pulmonary nodule, right POA: Yes    Hypokalemia POA: Yes  Resolved Problems:    * No resolved hospital problems. *      Review of Systems   Constitutional: Negative for fever and chills.   HENT: Negative for hearing loss and sore throat.    Eyes: Negative for blurred vision.   Respiratory: Positive for shortness of breath.    Cardiovascular: Negative for chest pain and palpitations.   Gastrointestinal: Negative for heartburn, nausea, vomiting and abdominal pain.   Musculoskeletal: Negative for joint pain.   Skin: Positive for itching and rash.        Macular erythematous rash on all over left knee,  Multiple small macular rashes on anterior abdominal  wall        Neurological: Negative for tingling, weakness and headaches.       Consultants/Specialty  None    Disposition  Discharge to home     Quality Measures  Labs reviewed and Medications reviewed  Caldwell catheter: No Caldwell      DVT Prophylaxis: Not indicated at this time, ambulatory              Physical Exam       Filed Vitals:    04/25/17 0611 04/25/17 0710 04/25/17 1004 04/25/17 1416   BP:  152/99     Pulse: 91 81 102 67   Temp:  36.4 °C (97.6 °F)     Resp: 16 20 18 18   Height:       Weight:       SpO2: 95% 92% 94% 97%     Body mass index is 24.21 kg/(m^2). Weight: 74.4 kg (164 lb 0.4 oz)  Oxygen Therapy:  Pulse Oximetry: 97 %, O2 (LPM): 2, O2 Delivery: None (Room Air)    Physical Exam   Constitutional: He is oriented to person, place, and time. No distress.   Adult male, not diaphoretic, afebrile   HENT:   Head: Normocephalic and atraumatic.   Eyes: Conjunctivae and EOM are normal. Pupils are equal, round, and reactive to light. Right eye exhibits no discharge. Left eye exhibits no discharge.   Neck: Normal range of motion. Neck supple.   Cardiovascular: Normal rate, regular rhythm and normal heart sounds.    No murmur heard.  Pulmonary/Chest: Effort normal. He has wheezes.   Abdominal: He exhibits no distension. There is no tenderness.   Musculoskeletal: He exhibits no edema or tenderness.   Neurological: He is alert and oriented to person, place, and time.   Skin: Skin is warm. Rash noted. He is not diaphoretic. There is erythema.   Macular erythematous rash on all over left knee,  Multiple small macular rashes on anterior abdominal wall   Psychiatric: Mood, memory, affect and judgment normal.         Lab Data Review:      4/25/2017  12:33 PM    Recent Labs      04/24/17   1706  04/25/17   0042   SODIUM  135  136   POTASSIUM  3.4*  4.0   CHLORIDE  101  105   CO2  23  22   BUN  15  13   CREATININE  0.84  0.69   CALCIUM  9.9  8.9       Recent Labs      04/24/17   1706  04/25/17   0042   ALTSGPT  36  33    ASTSGOT  30  25   ALKPHOSPHAT  87  70   TBILIRUBIN  0.8  0.5   GLUCOSE  146*  167*       Recent Labs      04/24/17   1706  04/25/17   0042   RBC  5.60  5.08   HEMOGLOBIN  16.6  15.2   HEMATOCRIT  49.3  44.9   PLATELETCT  347  332   PROTHROMBTM  15.4*   --    APTT  34.9   --    INR  1.18*   --        Recent Labs      04/24/17   1706  04/25/17   0042   WBC  9.6  6.4   NEUTSPOLYS  63.20  85.40*   LYMPHOCYTES  23.40  12.60*   MONOCYTES  9.30  1.10   EOSINOPHILS  3.10  0.00   BASOPHILS  0.50  0.30   ASTSGOT  30  25   ALTSGPT  36  33   ALKPHOSPHAT  87  70   TBILIRUBIN  0.8  0.5           Assessment/Plan     Acute exacerbation of  Asthma  -Prolonged Hx of asthma  -Hx of mice infestation  -Frequently using inhalers  - Recently received medrol dose pack and abx course  -Not on home O2    Plan:  -Ct  RT protocol  - Duonebs and albuterol prn  - Prednisone    SKIN Rash  ------------------  - Diffuse Maculopapular erythematous rash on knee, abdominal wall, itch lesions  - No discharge visible  - Hx of good response to  steroids      Plan  - Ct Predisone     Elevated lactic acid  - On admission 2.8-->3.8  - In the ED received Ceftriaxone and Azythromycin   - Patient was Hypoxic (80's) in the ED on arrival  -likely from hypoxemia  -BNP troponins normal and CTPE negative    Plan  - Continue to monitor  -Rpt lactate levels    Pulmonary Nodule  Subcentimeter (4mm), Incidental CTPE finding    Plan  -Outpatient follow-up

## 2017-04-26 NOTE — PROGRESS NOTES
[facility] – [place] Internal Medicine Interval Note    Name Everardo Edwards     1955   Age/Sex 61 y.o. male   MRN [id number]   Code Status FULL     After 5PM or if no immediate response to page, please call for cross-coverage  Attending/Team: Leno Call [phone] to page   1st Call - Day Intern (R1):   Mandi 2nd Call - Day Sr. Resident (R2/R3):   Rico     Chief complaint/ reason for interval visit (Primary Diagnosis)   Acute asthma exacerbation    Interval Problem Daily Status Update    -Patient desatted yesterday with ambulation. Did well today. Lungs were clear this morning. Will discharge today and recommend good follow up outpatient for better asthma control.    Active Problems:    Asthma exacerbation POA: Yes    Psoriasis and similar disorder POA: Yes      Overview: Left knee cap    Skin rash POA: Yes      Overview: Back/front torso, macular blanching dots     Prolonged Q-T interval on ECG POA: Yes    Pulmonary nodule, right POA: Yes    Hypokalemia POA: Yes  Resolved Problems:    * No resolved hospital problems. *      Review of Systems   Constitutional: Positive for weight loss. Negative for fever and chills.        15 lbs since knee surgery.   Eyes: Negative for blurred vision.   Respiratory: Positive for shortness of breath. Negative for cough, sputum production and wheezing.         Patient becomes short of breath upon physical exertion   Cardiovascular: Positive for orthopnea. Negative for chest pain.        Has difficulty sleeping due to difficulty breathing.   Gastrointestinal: Negative for heartburn, nausea, vomiting and abdominal pain.   Genitourinary: Negative for dysuria and urgency.   Skin: Positive for itching and rash.   Neurological: Negative for dizziness and headaches.   Psychiatric/Behavioral: Negative for depression and suicidal ideas. The patient is not nervous/anxious.        Consultants/Specialty  None    Disposition  Discharge to  home    Quality Measures    Caldwell catheter: No Caldwell      DVT Prophylaxis: Not indicated at this time, ambulatory  DVT prophylaxis - mechanical: Not indicated at this time, ambulatory            Physical Exam       Filed Vitals:    04/26/17 0400 04/26/17 0732 04/26/17 0800 04/26/17 1125   BP: 128/86  149/95    Pulse: 88 84 81 98   Temp: 37.3 °C (99.2 °F)  36.4 °C (97.6 °F)    Resp: 16 16 18 15   Height:       Weight:       SpO2: 94% 95% 94% 93%     Body mass index is 24.21 kg/(m^2).    Oxygen Therapy:  Pulse Oximetry: 93 %, O2 (LPM): 0, O2 Delivery: None (Room Air)    Physical Exam   Constitutional: He is oriented to person, place, and time and well-developed, well-nourished, and in no distress. No distress.   HENT:   Head: Normocephalic and atraumatic.   Eyes: Conjunctivae and EOM are normal.   Neck: Normal range of motion. No JVD present.   Cardiovascular:   Distant heart sounds   Pulmonary/Chest: Effort normal.   No wheezes auscultated, but breath sounds tight   Abdominal: Soft. Bowel sounds are normal.   Musculoskeletal: Normal range of motion. He exhibits no edema.   Neurological: He is alert and oriented to person, place, and time.   Skin: He is not diaphoretic.         Lab Data Review:      4/26/2017  11:51 AM    Recent Labs      04/24/17 1706 04/25/17 0042   SODIUM  135  136   POTASSIUM  3.4*  4.0   CHLORIDE  101  105   CO2  23  22   BUN  15  13   CREATININE  0.84  0.69   CALCIUM  9.9  8.9       Recent Labs      04/24/17 1706 04/25/17   0042   ALTSGPT  36  33   ASTSGOT  30  25   ALKPHOSPHAT  87  70   TBILIRUBIN  0.8  0.5   GLUCOSE  146*  167*       Recent Labs      04/24/17 1706 04/25/17 0042   RBC  5.60  5.08   HEMOGLOBIN  16.6  15.2   HEMATOCRIT  49.3  44.9   PLATELETCT  347  332   PROTHROMBTM  15.4*   --    APTT  34.9   --    INR  1.18*   --        Recent Labs      04/24/17 1706 04/25/17 0042   WBC  9.6  6.4   NEUTSPOLYS  63.20  85.40*   LYMPHOCYTES  23.40  12.60*   MONOCYTES  9.30  1.10    EOSINOPHILS  3.10  0.00   BASOPHILS  0.50  0.30   ASTSGOT  30  25   ALTSGPT  36  33   ALKPHOSPHAT  87  70   TBILIRUBIN  0.8  0.5           Assessment/Plan     Acute Asthma exacerbation  - Wheezing on exam-RESOLVED  - Increased albuterol use from zero puffs to 10 a day  - Prior URI possible inciting cause, pt also has cats and mice infestation at home  - Patient generally well controlled on inhaled daily corticosteroid  - Pt does not require home oxygen, may need home O2     Plan  - RT protocol  - Continue duonebs and albuterol prn  - Continue prednisone PO    Rash, unknown cause  - Diffuse, red, raised, macules on torso  - 6 cm plaque on left lateral knee, no drainage noted.  - Both noted to be itchy, but improves with oral steroids  - Erythema noted throughout the the upper thorax, circumferential around the neck, back, and chest. Blanches.   - Knee rash improving with topical steroid    Plan  - PO predisone for asthma will help treat acutely  - Topical corticosteroid  - Recommend outpatient follow up    Elevated lactic acid  - 2.8-->3.8-->2.7  - Patient given ceftriaxone and azythromycin in the ED  - Patient also noted to be hypoxic (high 80's) in the ED  - Patient reports he is chronically at 90% saturation at home.    Plan  - Continue to monitor    Pulmonary Nodule  - Incidental CTPE finding  - 4 cm    Plan  -Outpatient follow-up and monitoring

## 2017-04-26 NOTE — CARE PLAN
Problem: Safety  Goal: Free from accidental injury  Safety precautions in place. Bed in low position, treaded socks on, personal possessions in reach, call light in reach.     Problem: Risk for Deep Vein Thrombosis/Venous Thromboembolism  Goal: DVT/VTE Prevention Measures in Place  Pt receiving daily lovenox injections.

## 2017-04-26 NOTE — PROGRESS NOTES
Pt AOx4, denies pain. Pt up self, ambulatory with steady gait. Pt resting quietly in bed, needs met. Call light within reach, personal belongings available, bed in lowest position, treaded socks on. Hourly rounding in place.

## 2017-04-29 LAB
BACTERIA BLD CULT: NORMAL
BACTERIA BLD CULT: NORMAL
SIGNIFICANT IND 70042: NORMAL
SIGNIFICANT IND 70042: NORMAL
SITE SITE: NORMAL
SITE SITE: NORMAL
SOURCE SOURCE: NORMAL
SOURCE SOURCE: NORMAL

## 2017-07-19 ENCOUNTER — HOSPITAL ENCOUNTER (OUTPATIENT)
Dept: OTHER | Facility: MEDICAL CENTER | Age: 62
End: 2017-07-19
Attending: ALLERGY & IMMUNOLOGY
Payer: COMMERCIAL

## 2017-07-19 PROCEDURE — 94729 DIFFUSING CAPACITY: CPT

## 2017-07-19 PROCEDURE — 94726 PLETHYSMOGRAPHY LUNG VOLUMES: CPT

## 2017-07-19 PROCEDURE — 94060 EVALUATION OF WHEEZING: CPT | Mod: 26 | Performed by: INTERNAL MEDICINE

## 2017-07-19 PROCEDURE — 94726 PLETHYSMOGRAPHY LUNG VOLUMES: CPT | Mod: 26 | Performed by: INTERNAL MEDICINE

## 2017-07-19 PROCEDURE — 94729 DIFFUSING CAPACITY: CPT | Mod: 26 | Performed by: INTERNAL MEDICINE

## 2017-07-19 PROCEDURE — 94060 EVALUATION OF WHEEZING: CPT

## 2017-07-20 NOTE — PROCEDURES
Test done July 19, 2017.  Severe obstruction.  FEV1 1.28 L.  Or 36% predicted.  Mid flows very low.  Definite bronchodilator response.  Severe hyperinflation.  Preserved oxygen transfer.  Flow-volume loop confirms severe obstruction.  Good effort noted

## 2017-11-13 ENCOUNTER — HOSPITAL ENCOUNTER (OUTPATIENT)
Dept: HOSPITAL 8 - OUT | Age: 62
Discharge: HOME | End: 2017-11-13
Attending: OTOLARYNGOLOGY
Payer: COMMERCIAL

## 2017-11-13 VITALS — BODY MASS INDEX: 24 KG/M2 | HEIGHT: 69 IN | WEIGHT: 162.04 LBS

## 2017-11-13 VITALS — SYSTOLIC BLOOD PRESSURE: 127 MMHG | DIASTOLIC BLOOD PRESSURE: 87 MMHG

## 2017-11-13 DIAGNOSIS — I10: ICD-10-CM
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
## 2017-11-13 DIAGNOSIS — J34.2: Primary | ICD-10-CM
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
## 2017-11-13 DIAGNOSIS — J45.909: ICD-10-CM

## 2017-11-13 DIAGNOSIS — M95.0: ICD-10-CM

## 2017-11-13 DIAGNOSIS — Z87.39: ICD-10-CM

## 2017-11-13 DIAGNOSIS — J34.3: ICD-10-CM
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
## 2017-11-13 DIAGNOSIS — S02.2XXA: ICD-10-CM

## 2017-11-13 PROCEDURE — 30520 REPAIR OF NASAL SEPTUM: CPT

## 2017-11-13 PROCEDURE — 30140 RESECT INFERIOR TURBINATE: CPT

## 2017-11-13 PROCEDURE — 30465 REPAIR NASAL STENOSIS: CPT

## 2018-08-13 ENCOUNTER — OFFICE VISIT (OUTPATIENT)
Dept: PULMONOLOGY | Facility: HOSPICE | Age: 63
End: 2018-08-13
Payer: COMMERCIAL

## 2018-08-13 VITALS
TEMPERATURE: 97.5 F | BODY MASS INDEX: 25.12 KG/M2 | HEART RATE: 71 BPM | DIASTOLIC BLOOD PRESSURE: 82 MMHG | OXYGEN SATURATION: 94 % | RESPIRATION RATE: 16 BRPM | WEIGHT: 169.6 LBS | SYSTOLIC BLOOD PRESSURE: 122 MMHG | HEIGHT: 69 IN

## 2018-08-13 DIAGNOSIS — G47.33 OSA (OBSTRUCTIVE SLEEP APNEA): ICD-10-CM

## 2018-08-13 DIAGNOSIS — Z78.9 NONSMOKER: ICD-10-CM

## 2018-08-13 DIAGNOSIS — J45.40 MODERATE PERSISTENT ASTHMA WITHOUT COMPLICATION: ICD-10-CM

## 2018-08-13 PROCEDURE — 99204 OFFICE O/P NEW MOD 45 MIN: CPT | Performed by: INTERNAL MEDICINE

## 2018-08-13 RX ORDER — LOSARTAN POTASSIUM 50 MG/1
TABLET ORAL
Refills: 0 | COMMUNITY
Start: 2018-07-08

## 2018-08-13 RX ORDER — MONTELUKAST SODIUM 10 MG/1
TABLET ORAL
Refills: 0 | COMMUNITY
Start: 2018-07-08

## 2018-08-13 RX ORDER — ALBUTEROL SULFATE 90 UG/1
2 AEROSOL, METERED RESPIRATORY (INHALATION) EVERY 6 HOURS PRN
COMMUNITY

## 2018-08-13 RX ORDER — TIOTROPIUM BROMIDE INHALATION SPRAY 1.56 UG/1
SPRAY, METERED RESPIRATORY (INHALATION)
Refills: 0 | COMMUNITY
Start: 2018-08-06

## 2018-08-13 RX ORDER — ALBUTEROL SULFATE 1.25 MG/3ML
SOLUTION RESPIRATORY (INHALATION)
Refills: 0 | COMMUNITY
Start: 2018-06-19

## 2018-08-13 NOTE — PROGRESS NOTES
Chief Complaint   Patient presents with   • Establish Care     Referred by  for Asthma. prior  patient       HPI: This patient is a 62 y.o. Male from [place], [place] who is referred for obstructive sleep apnea and asthma.  He is a former patient of Dr. Bui's, and is now established with Dr. Garrido, allergist, for asthma management.  He has had lifelong asthma since childhood, with severe airway obstruction on pulmonary function testing, FEV1 around 30%.  He is compliant with Advair 500/50, Spiriva and started Xolair 3 months ago.  He feels the Xolair has significantly improved his asthma symptoms.  Asthma triggers include environmental allergies and smoke.  He denies tobacco use.  He is considering moving to lower altitude on account of his breathing issues.  He had polysomnography in the sleep laboratory in September 29, 2017 showing severe BERTA, AHI: 30 events per hour associated with mild hypoxia.  He was titrated to CPAP: 7 cm of water.  He forgot his CPAP compliance card today; he states uses CPAP nightly.  Uses a fullface mask and obtain supplies through [facility].  He denies any issues with CPAP mask or CPAP use.  He struggles sleeping more than 5 hours nightly however feel CPAP has improved his sleep quality.  His weight has been stable.  He denies daytime somnolence.      Past Medical History:   Diagnosis Date   • Anesthesia     PONV   • Arthritis    • Asthma    • Dental disorder     upper partial denture   • Hypertension    • Pain     neck and left knee       Social History     Social History   • Marital status:      Spouse name: N/A   • Number of children: N/A   • Years of education: N/A     Occupational History   • Not on file.     Social History Main Topics   • Smoking status: Never Smoker   • Smokeless tobacco: Never Used   • Alcohol use No      Comment: rarely   • Drug use: No   • Sexual activity: Not on file     Other Topics Concern   • Not on file     Social History  "Narrative   • No narrative on file       Family History   Problem Relation Age of Onset   • Hypertension Mother        Current Outpatient Prescriptions on File Prior to Visit   Medication Sig Dispense Refill   • hydrocodone/acetaminophen (NORCO)  MG Tab Take 1-2 Tabs by mouth every four hours as needed for Moderate Pain. 60 Tab 0   • cyclobenzaprine (FLEXERIL) 10 MG Tab Take 10 mg by mouth 3 times a day as needed.     • albuterol 108 (90 BASE) MCG/ACT Aero Soln inhalation aerosol Inhale 2 Puffs by mouth every 6 hours as needed for Shortness of Breath.     • GuaiFENesin (MUCINEX PO) Take 15-30 mL by mouth 2 times a day as needed.     • fluticasone-salmeterol (ADVAIR) 100-50 MCG/DOSE AEROSOL POWDER, BREATH ACTIVATED Inhale 1 Puff by mouth every day.       No current facility-administered medications on file prior to visit.        Allergies: Patient has no known allergies.    ROS:   Constitutional: Denies fevers, chills, night sweats, fatigue or weight loss  Eyes: Denies vision loss, pain, drainage, double vision  Ears, Nose, Throat: Denies earache, +difficulty hearing, +tinnitus, denies nasal congestion, +hoarseness  Cardiovascular: Denies chest pain, tightness, palpitations, orthopnea or edema  Respiratory: As in HPI  Sleep: Denies daytime sleepiness, snoring, apneas, insomnia, morning headaches  GI: Denies heartburn, dysphagia, nausea, abdominal pain, diarrhea or constipation  : Denies frequent urination, hematuria, discharge or painful urination  Musculoskeletal: +back pain, denies painful joints, sore muscles  Neurological: Denies weakness or headaches  Skin: +rashes    Blood pressure 122/82, pulse 71, temperature 36.4 °C (97.5 °F), resp. rate 16, height 1.753 m (5' 9\"), weight 76.9 kg (169 lb 9.6 oz), SpO2 94 %.    Physical Exam:  Appearance: Well-nourished, well-developed, in no acute distress  HEENT: Normocephalic, atraumatic, white sclera, PERRLA, oropharynx clear, Mallampati 3  Neck: No adenopathy or " masses  Respiratory: no intercostal retractions or accessory muscle use  Lungs auscultation: Clear to auscultation bilaterally, diminished breath sounds  Cardiovascular: Regular rate rhythm. No murmurs, rubs or gallops.  No LE edema  Abdomen: soft, nondistended  Gait: Normal  Digits: No clubbing, cyanosis  Motor: No focal deficits  Orientation: Oriented to time, person and place    Diagnosis:  1. Moderate persistent asthma without complication     2. BERTA (obstructive sleep apnea)  DME MASK AND SUPPLIES   3. Nonsmoker         Plan:  The patient presents to establish care for BERTA, previously managed by Dr. Bui.  He has significant asthma, managed by Dr. Garrido, allergist.  Prescription submitted to his DME for CPAP supplies.  Will request his DME to download his CPAP compliance card- he was instructed to bring his compliance chip each visit.  Encouraged nightly CPAP usage for minimal 4 hours nightly.  Replace CPAP mask every 3 months.  Maintain normal BMI.  Return in about 3 months (around 11/13/2018).

## 2018-11-19 ENCOUNTER — APPOINTMENT (OUTPATIENT)
Dept: PULMONOLOGY | Facility: HOSPICE | Age: 63
End: 2018-11-19
Payer: COMMERCIAL